# Patient Record
Sex: MALE | Race: WHITE | NOT HISPANIC OR LATINO | Employment: FULL TIME | URBAN - METROPOLITAN AREA
[De-identification: names, ages, dates, MRNs, and addresses within clinical notes are randomized per-mention and may not be internally consistent; named-entity substitution may affect disease eponyms.]

---

## 2017-01-21 ENCOUNTER — HOSPITAL ENCOUNTER (INPATIENT)
Facility: MEDICAL CENTER | Age: 22
LOS: 1 days | DRG: 316 | End: 2017-01-22
Attending: EMERGENCY MEDICINE | Admitting: INTERNAL MEDICINE

## 2017-01-21 ENCOUNTER — RESOLUTE PROFESSIONAL BILLING HOSPITAL PROF FEE (OUTPATIENT)
Dept: HOSPITALIST | Facility: MEDICAL CENTER | Age: 22
End: 2017-01-21

## 2017-01-21 ENCOUNTER — APPOINTMENT (OUTPATIENT)
Dept: RADIOLOGY | Facility: MEDICAL CENTER | Age: 22
DRG: 316 | End: 2017-01-21
Attending: INTERNAL MEDICINE

## 2017-01-21 DIAGNOSIS — R40.0 SOMNOLENCE: ICD-10-CM

## 2017-01-21 DIAGNOSIS — T40.3X1A: ICD-10-CM

## 2017-01-21 PROBLEM — I95.9 HYPOTENSION: Status: ACTIVE | Noted: 2017-01-21

## 2017-01-21 LAB
ALBUMIN SERPL BCP-MCNC: 4.2 G/DL (ref 3.2–4.9)
ALBUMIN/GLOB SERPL: 1.3 G/DL
ALP SERPL-CCNC: 82 U/L (ref 30–99)
ALT SERPL-CCNC: 17 U/L (ref 2–50)
ANION GAP SERPL CALC-SCNC: 10 MMOL/L (ref 0–11.9)
APAP SERPL-MCNC: <10 UG/ML (ref 10–30)
AST SERPL-CCNC: 24 U/L (ref 12–45)
BASOPHILS # BLD AUTO: 0.6 % (ref 0–1.8)
BASOPHILS # BLD: 0.05 K/UL (ref 0–0.12)
BILIRUB SERPL-MCNC: 0.5 MG/DL (ref 0.1–1.5)
BUN SERPL-MCNC: 13 MG/DL (ref 8–22)
CALCIUM SERPL-MCNC: 8.8 MG/DL (ref 8.5–10.5)
CHLORIDE SERPL-SCNC: 101 MMOL/L (ref 96–112)
CK SERPL-CCNC: 77 U/L (ref 0–154)
CO2 SERPL-SCNC: 23 MMOL/L (ref 20–33)
CREAT SERPL-MCNC: 1.09 MG/DL (ref 0.5–1.4)
EOSINOPHIL # BLD AUTO: 0.16 K/UL (ref 0–0.51)
EOSINOPHIL NFR BLD: 1.8 % (ref 0–6.9)
ERYTHROCYTE [DISTWIDTH] IN BLOOD BY AUTOMATED COUNT: 45.9 FL (ref 35.9–50)
ETHANOL BLD-MCNC: 0 G/DL
GFR SERPL CREATININE-BSD FRML MDRD: >60 ML/MIN/1.73 M 2
GLOBULIN SER CALC-MCNC: 3.3 G/DL (ref 1.9–3.5)
GLUCOSE SERPL-MCNC: 348 MG/DL (ref 65–99)
HCT VFR BLD AUTO: 44.3 % (ref 42–52)
HGB BLD-MCNC: 15.2 G/DL (ref 14–18)
IMM GRANULOCYTES # BLD AUTO: 0.03 K/UL (ref 0–0.11)
IMM GRANULOCYTES NFR BLD AUTO: 0.3 % (ref 0–0.9)
LYMPHOCYTES # BLD AUTO: 1.73 K/UL (ref 1–4.8)
LYMPHOCYTES NFR BLD: 19.2 % (ref 22–41)
MAGNESIUM SERPL-MCNC: 1.8 MG/DL (ref 1.5–2.5)
MCH RBC QN AUTO: 30.4 PG (ref 27–33)
MCHC RBC AUTO-ENTMCNC: 34.3 G/DL (ref 33.7–35.3)
MCV RBC AUTO: 88.6 FL (ref 81.4–97.8)
MONOCYTES # BLD AUTO: 0.17 K/UL (ref 0–0.85)
MONOCYTES NFR BLD AUTO: 1.9 % (ref 0–13.4)
NEUTROPHILS # BLD AUTO: 6.89 K/UL (ref 1.82–7.42)
NEUTROPHILS NFR BLD: 76.2 % (ref 44–72)
NRBC # BLD AUTO: 0 K/UL
NRBC BLD AUTO-RTO: 0 /100 WBC
PHOSPHATE SERPL-MCNC: 6.3 MG/DL (ref 2.5–4.5)
PLATELET # BLD AUTO: 195 K/UL (ref 164–446)
PMV BLD AUTO: 8.6 FL (ref 9–12.9)
POTASSIUM SERPL-SCNC: 4.5 MMOL/L (ref 3.6–5.5)
PROT SERPL-MCNC: 7.5 G/DL (ref 6–8.2)
RBC # BLD AUTO: 5 M/UL (ref 4.7–6.1)
SALICYLATES SERPL-MCNC: 0 MG/DL (ref 15–25)
SODIUM SERPL-SCNC: 134 MMOL/L (ref 135–145)
WBC # BLD AUTO: 9 K/UL (ref 4.8–10.8)

## 2017-01-21 PROCEDURE — 93005 ELECTROCARDIOGRAM TRACING: CPT | Performed by: EMERGENCY MEDICINE

## 2017-01-21 PROCEDURE — 700111 HCHG RX REV CODE 636 W/ 250 OVERRIDE (IP): Performed by: EMERGENCY MEDICINE

## 2017-01-21 PROCEDURE — 71010 DX-CHEST-PORTABLE (1 VIEW): CPT

## 2017-01-21 PROCEDURE — 99291 CRITICAL CARE FIRST HOUR: CPT

## 2017-01-21 PROCEDURE — 94760 N-INVAS EAR/PLS OXIMETRY 1: CPT

## 2017-01-21 PROCEDURE — 82550 ASSAY OF CK (CPK): CPT

## 2017-01-21 PROCEDURE — 700102 HCHG RX REV CODE 250 W/ 637 OVERRIDE(OP): Performed by: INTERNAL MEDICINE

## 2017-01-21 PROCEDURE — 700105 HCHG RX REV CODE 258: Performed by: EMERGENCY MEDICINE

## 2017-01-21 PROCEDURE — 80053 COMPREHEN METABOLIC PANEL: CPT

## 2017-01-21 PROCEDURE — 96366 THER/PROPH/DIAG IV INF ADDON: CPT

## 2017-01-21 PROCEDURE — 80307 DRUG TEST PRSMV CHEM ANLYZR: CPT

## 2017-01-21 PROCEDURE — 96376 TX/PRO/DX INJ SAME DRUG ADON: CPT

## 2017-01-21 PROCEDURE — 36415 COLL VENOUS BLD VENIPUNCTURE: CPT

## 2017-01-21 PROCEDURE — 770022 HCHG ROOM/CARE - ICU (200)

## 2017-01-21 PROCEDURE — 96361 HYDRATE IV INFUSION ADD-ON: CPT

## 2017-01-21 PROCEDURE — 99291 CRITICAL CARE FIRST HOUR: CPT | Mod: 25 | Performed by: INTERNAL MEDICINE

## 2017-01-21 PROCEDURE — 700105 HCHG RX REV CODE 258: Performed by: INTERNAL MEDICINE

## 2017-01-21 PROCEDURE — 96375 TX/PRO/DX INJ NEW DRUG ADDON: CPT

## 2017-01-21 PROCEDURE — 99407 BEHAV CHNG SMOKING > 10 MIN: CPT | Performed by: INTERNAL MEDICINE

## 2017-01-21 PROCEDURE — A9270 NON-COVERED ITEM OR SERVICE: HCPCS | Performed by: INTERNAL MEDICINE

## 2017-01-21 PROCEDURE — 96365 THER/PROPH/DIAG IV INF INIT: CPT

## 2017-01-21 PROCEDURE — 700111 HCHG RX REV CODE 636 W/ 250 OVERRIDE (IP): Performed by: INTERNAL MEDICINE

## 2017-01-21 PROCEDURE — 83735 ASSAY OF MAGNESIUM: CPT

## 2017-01-21 PROCEDURE — 85025 COMPLETE CBC W/AUTO DIFF WBC: CPT

## 2017-01-21 PROCEDURE — 84100 ASSAY OF PHOSPHORUS: CPT

## 2017-01-21 RX ORDER — NICOTINE 21 MG/24HR
21 PATCH, TRANSDERMAL 24 HOURS TRANSDERMAL
Status: DISCONTINUED | OUTPATIENT
Start: 2017-01-22 | End: 2017-01-22 | Stop reason: HOSPADM

## 2017-01-21 RX ORDER — SODIUM CHLORIDE 9 MG/ML
INJECTION, SOLUTION INTRAVENOUS CONTINUOUS
Status: DISCONTINUED | OUTPATIENT
Start: 2017-01-21 | End: 2017-01-22

## 2017-01-21 RX ORDER — NALOXONE HYDROCHLORIDE 0.4 MG/ML
0.2 INJECTION, SOLUTION INTRAMUSCULAR; INTRAVENOUS; SUBCUTANEOUS ONCE
Status: COMPLETED | OUTPATIENT
Start: 2017-01-21 | End: 2017-01-21

## 2017-01-21 RX ORDER — NICOTINE 21 MG/24HR
21 PATCH, TRANSDERMAL 24 HOURS TRANSDERMAL
Status: DISCONTINUED | OUTPATIENT
Start: 2017-01-21 | End: 2017-01-21

## 2017-01-21 RX ORDER — ONDANSETRON 4 MG/1
4 TABLET, ORALLY DISINTEGRATING ORAL EVERY 4 HOURS PRN
Status: DISCONTINUED | OUTPATIENT
Start: 2017-01-21 | End: 2017-01-22 | Stop reason: HOSPADM

## 2017-01-21 RX ORDER — ACETAMINOPHEN 325 MG/1
650 TABLET ORAL EVERY 6 HOURS PRN
Status: DISCONTINUED | OUTPATIENT
Start: 2017-01-21 | End: 2017-01-22 | Stop reason: HOSPADM

## 2017-01-21 RX ORDER — PROMETHAZINE HYDROCHLORIDE 25 MG/1
12.5-25 SUPPOSITORY RECTAL EVERY 4 HOURS PRN
Status: DISCONTINUED | OUTPATIENT
Start: 2017-01-21 | End: 2017-01-22 | Stop reason: HOSPADM

## 2017-01-21 RX ORDER — PROMETHAZINE HYDROCHLORIDE 25 MG/1
12.5-25 TABLET ORAL EVERY 4 HOURS PRN
Status: DISCONTINUED | OUTPATIENT
Start: 2017-01-21 | End: 2017-01-22 | Stop reason: HOSPADM

## 2017-01-21 RX ORDER — NICOTINE 21 MG/24HR
21 PATCH, TRANSDERMAL 24 HOURS TRANSDERMAL ONCE
Status: COMPLETED | OUTPATIENT
Start: 2017-01-21 | End: 2017-01-23

## 2017-01-21 RX ORDER — ONDANSETRON 2 MG/ML
4 INJECTION INTRAMUSCULAR; INTRAVENOUS ONCE
Status: COMPLETED | OUTPATIENT
Start: 2017-01-21 | End: 2017-01-21

## 2017-01-21 RX ORDER — ONDANSETRON 2 MG/ML
4 INJECTION INTRAMUSCULAR; INTRAVENOUS EVERY 4 HOURS PRN
Status: DISCONTINUED | OUTPATIENT
Start: 2017-01-21 | End: 2017-01-22 | Stop reason: HOSPADM

## 2017-01-21 RX ORDER — SODIUM CHLORIDE 9 MG/ML
1000 INJECTION, SOLUTION INTRAVENOUS ONCE
Status: COMPLETED | OUTPATIENT
Start: 2017-01-21 | End: 2017-01-21

## 2017-01-21 RX ADMIN — ONDANSETRON 4 MG: 2 INJECTION, SOLUTION INTRAMUSCULAR; INTRAVENOUS at 15:32

## 2017-01-21 RX ADMIN — ONDANSETRON 4 MG: 2 INJECTION, SOLUTION INTRAMUSCULAR; INTRAVENOUS at 20:41

## 2017-01-21 RX ADMIN — NALOXONE HYDROCHLORIDE 0.2 MG: 0.4 INJECTION, SOLUTION INTRAMUSCULAR; INTRAVENOUS; SUBCUTANEOUS at 15:05

## 2017-01-21 RX ADMIN — SODIUM CHLORIDE: 9 INJECTION, SOLUTION INTRAVENOUS at 20:46

## 2017-01-21 RX ADMIN — NICOTINE 21 MG: 21 PATCH TRANSDERMAL at 20:43

## 2017-01-21 RX ADMIN — SODIUM CHLORIDE 1000 ML: 9 INJECTION, SOLUTION INTRAVENOUS at 15:32

## 2017-01-21 RX ADMIN — NALOXONE HYDROCHLORIDE 0.1 MG/HR: 1 INJECTION PARENTERAL at 20:44

## 2017-01-21 ASSESSMENT — PAIN SCALES - GENERAL: PAINLEVEL_OUTOF10: 0

## 2017-01-21 ASSESSMENT — LIFESTYLE VARIABLES: DO YOU DRINK ALCOHOL: NO

## 2017-01-21 NOTE — ED NOTES
"Pt bib ems per report pt ingested 95 mg of his sisters methadone. PT was found apneic and unresponsive.  PT was given narcan PTA.  PT arrives A&O x 4.  PT denies SI or HI states he took the pills to \"get high\".    Chief Complaint   Patient presents with   • Drug Overdose     Blood pressure 131/68, pulse 84, temperature 37.1 °C (98.8 °F), resp. rate 16, height 1.753 m (5' 9\"), weight 58.968 kg (130 lb).    "

## 2017-01-21 NOTE — ED NOTES
The Medication Reconciliation has been completed per patient  Allergies have been reviewed    No Abx in the past month    [facility]

## 2017-01-21 NOTE — ED PROVIDER NOTES
"ED Provider Note    CHIEF COMPLAINT  Chief Complaint   Patient presents with   • Drug Overdose       HPI  Ghulam Schmitt is a 21 y.o. male who presents via ambulance for an apparent overdose on methadone, found apneic and unresponsive by family supposedly so they called EMS. He received Narcan by EMS and awoke appropriately. The patient admits to ingesting 95 mg of a liquid methadone which is prescribed to his sister. He was trying to get high denies suicidal and homicidal thoughts. The patient reports chronic abuse of opiate pills but has never use any injectable drugs. The patient wishes to get clean but seems to be struggling to do so. He has no physical complaints at this time    REVIEW OF SYSTEMS  Negative for fever, rash, chest pain, dyspnea, abdominal pain, nausea, vomiting, diarrhea, headache, focal weakness, focal numbness, focal tingling, back pain. All other systems are negative.     PAST MEDICAL HISTORY  Past Medical History   Diagnosis Date   • Ulcer ([facility])        FAMILY HISTORY  History reviewed. No pertinent family history.    SOCIAL HISTORY  Social History   Substance Use Topics   • Smoking status: Current Every Day Smoker -- 1.00 packs/day     Types: Cigarettes   • Smokeless tobacco: None   • Alcohol Use: Yes      Comment: occasional beers and shots       SURGICAL HISTORY  History reviewed. No pertinent past surgical history.    CURRENT MEDICATIONS  I personally reviewed the medication list in the charting documentation.     ALLERGIES  No Known Allergies    MEDICAL RECORD  I have reviewed patient's medical record and pertinent results are listed above.      PHYSICAL EXAM  VITAL SIGNS: /68 mmHg  Pulse 84  Temp(Src) 37.1 °C (98.8 °F)  Resp 16  Ht 1.753 m (5' 9\")  Wt 58.968 kg (130 lb)  BMI 19.19 kg/m2   Constitutional: Sleepy but arousable to verbal stimulus  HENT: Mucus membranes moist.    Eyes: No scleral icterus. Normal conjunctiva   Neck: Supple, comfortable, nonpainful range of " motion.   Cardiovascular: Regular heart rate and rhythm.   Thorax & Lungs: Chest is nontender.  Lungs are clear to auscultation with good air movement bilaterally.  No wheeze, rhonchi, nor rales.   Abdomen: Soft, with no tenderness, rebound nor guarding.  No mass or pulsatile mass appreciated.  Skin: Warm, dry. No rash appreciated  Extremities/Musculoskeletal: No sign of trauma. No asymmetric calf tenderness, erythema or edema. Normal range of motion   Neurologic: Sleepy but once easily arouse he is alert and oriented with no focal neurologic deficits  Psychiatric: Normal affect appropriate for the clinical situation.    DIAGNOSTIC STUDIES / PROCEDURES    EKG  12 Lead EKG interpreted by me to show:    Rate 71  Rhythm: Normal sinus rhythm  Axis: Normal  DE and QRS Intervals: Normal  T waves: No acute changes  ST segments: No acute changes  Ectopy: None.    My impression of this EKG: Does not indicate ischemia or arrythmia at this time.      LABS  Results for orders placed or performed during the hospital encounter of 01/21/17   Blood Alcohol   Result Value Ref Range    Diagnostic Alcohol 0.00 0.00 g/dL   CBC WITH DIFFERENTIAL   Result Value Ref Range    WBC 9.0 4.8 - 10.8 K/uL    RBC 5.00 4.70 - 6.10 M/uL    Hemoglobin 15.2 14.0 - 18.0 g/dL    Hematocrit 44.3 42.0 - 52.0 %    MCV 88.6 81.4 - 97.8 fL    MCH 30.4 27.0 - 33.0 pg    MCHC 34.3 33.7 - 35.3 g/dL    RDW 45.9 35.9 - 50.0 fL    Platelet Count 195 164 - 446 K/uL    MPV 8.6 (L) 9.0 - 12.9 fL    Neutrophils-Polys 76.20 (H) 44.00 - 72.00 %    Lymphocytes 19.20 (L) 22.00 - 41.00 %    Monocytes 1.90 0.00 - 13.40 %    Eosinophils 1.80 0.00 - 6.90 %    Basophils 0.60 0.00 - 1.80 %    Immature Granulocytes 0.30 0.00 - 0.90 %    Nucleated RBC 0.00 /100 WBC    Neutrophils (Absolute) 6.89 1.82 - 7.42 K/uL    Lymphs (Absolute) 1.73 1.00 - 4.80 K/uL    Monos (Absolute) 0.17 0.00 - 0.85 K/uL    Eos (Absolute) 0.16 0.00 - 0.51 K/uL    Baso (Absolute) 0.05 0.00 - 0.12 K/uL     Immature Granulocytes (abs) 0.03 0.00 - 0.11 K/uL    NRBC (Absolute) 0.00 K/uL   COMP METABOLIC PANEL   Result Value Ref Range    Sodium 134 (L) 135 - 145 mmol/L    Potassium 4.5 3.6 - 5.5 mmol/L    Chloride 101 96 - 112 mmol/L    Co2 23 20 - 33 mmol/L    Anion Gap 10.0 0.0 - 11.9    Glucose 348 (H) 65 - 99 mg/dL    Bun 13 8 - 22 mg/dL    Creatinine 1.09 0.50 - 1.40 mg/dL    Calcium 8.8 8.5 - 10.5 mg/dL    AST(SGOT) 24 12 - 45 U/L    ALT(SGPT) 17 2 - 50 U/L    Alkaline Phosphatase 82 30 - 99 U/L    Total Bilirubin 0.5 0.1 - 1.5 mg/dL    Albumin 4.2 3.2 - 4.9 g/dL    Total Protein 7.5 6.0 - 8.2 g/dL    Globulin 3.3 1.9 - 3.5 g/dL    A-G Ratio 1.3 g/dL   Acetaminophen Level   Result Value Ref Range    Acetomenophen -Tylenol <10 10 - 30 ug/mL   SALICYLATE LEVEL   Result Value Ref Range    Salicylates, Quant. 0 (L) 15 - 25 mg/dL   ESTIMATED GFR   Result Value Ref Range    GFR If African American >60 >60 mL/min/1.73 m 2    GFR If Non African American >60 >60 mL/min/1.73 m 2   CREATINE KINASE   Result Value Ref Range    CPK Total 77 0 - 154 U/L   MAGNESIUM   Result Value Ref Range    Magnesium 1.8 1.5 - 2.5 mg/dL   PHOSPHORUS   Result Value Ref Range    Phosphorus 6.3 (H) 2.5 - 4.5 mg/dL   EKG (NOW)   Result Value Ref Range    Report       [facility] Emergency Dept.    Test Date:  2017  Pt Name:    RUBEN SHETH                Department: ER  MRN:        [id number]                      Room:       RD 03  Gender:     M                            Technician: [id number]  :        1995                   Requested By:CONRAD AGRAWAL  Order #:    [id number]                    Reading MD:    Measurements  Intervals                                Axis  Rate:       71                           P:          70  UT:         140                          QRS:        68  QRSD:       110                          T:          54  QT:         400  QTc:        435    Interpretive Statements  SINUS  RHYTHM  INCOMPLETE RIGHT BUNDLE BRANCH BLOCK  PROBABLE LEFT VENTRICULAR HYPERTROPHY  No previous ECG available for comparison           COURSE & MEDICAL DECISION MAKING  I have reviewed any medical record information, laboratory studies and radiographic results as noted above.    Encounter Summary: This is a 21 y.o. male with an apparent overdose on Narcan, apneic and unresponsive upon arrival of EMS but response to Narcan. Upon my examination he seems to be getting sleepy again which is certainly an effect of the shorter half-life of Narcan when compared to methadone. Once aroused however he is appropriate. Denies any other ingestions and suicidal thought. However given the ingestion of methadone the patient will have to be admitted to the hospital for close observation and repeat dosing of Narcan if needed. I'll check acetaminophen and salicylate with other typical overdose labs and keep a close eye on him here in the emergency department, I will frequently dosed him with Narcan if needed if his mental status deteriorates ---- patient observed closely, his family has been updated and patient has been admitted in guarded condition    CRITICAL CARE  The very real possibilty of a deterioration of this patient's condition required the highest level of my preparedness for sudden, emergent intervention.  I provided critical care services, which included medication orders, frequent reevaluations of the patient's condition and response to treatment, ordering and reviewing test results, and discussing the case with various consultants.  The critical care time associated with the care of the patient was 41 minutes. Review chart for interventions. This time is exclusive of any other billable procedures.         DISPOSITION: Admitted in guarded condition      FINAL IMPRESSION  1. Methadone overdose, accidental or unintentional, initial encounter    2. Somnolence           This dictation was created using voice recognition  software. The accuracy of the dictation is limited to the abilities of the software. I expect there may be some errors of grammar and possibly content. The nursing notes were reviewed and certain aspects of this information were incorporated into this note.    Electronically signed by: Danilo Quintana, 1/21/2017 3:14 PM

## 2017-01-22 VITALS
SYSTOLIC BLOOD PRESSURE: 131 MMHG | HEIGHT: 69 IN | TEMPERATURE: 98.7 F | WEIGHT: 120.59 LBS | RESPIRATION RATE: 27 BRPM | OXYGEN SATURATION: 95 % | DIASTOLIC BLOOD PRESSURE: 68 MMHG | BODY MASS INDEX: 17.86 KG/M2 | HEART RATE: 81 BPM

## 2017-01-22 PROBLEM — Z72.0 TOBACCO ABUSE: Status: ACTIVE | Noted: 2017-01-22

## 2017-01-22 PROBLEM — E83.39 HYPERPHOSPHATEMIA: Status: ACTIVE | Noted: 2017-01-22

## 2017-01-22 LAB
25(OH)D3 SERPL-MCNC: 9 NG/ML (ref 30–100)
AMPHET UR QL SCN: NEGATIVE
ANION GAP SERPL CALC-SCNC: 5 MMOL/L (ref 0–11.9)
BARBITURATES UR QL SCN: NEGATIVE
BASOPHILS # BLD AUTO: 0.3 % (ref 0–1.8)
BASOPHILS # BLD: 0.03 K/UL (ref 0–0.12)
BENZODIAZ UR QL SCN: NEGATIVE
BUN SERPL-MCNC: 10 MG/DL (ref 8–22)
BZE UR QL SCN: NEGATIVE
CALCIUM SERPL-MCNC: 8.4 MG/DL (ref 8.5–10.5)
CANNABINOIDS UR QL SCN: POSITIVE
CHLORIDE SERPL-SCNC: 105 MMOL/L (ref 96–112)
CO2 SERPL-SCNC: 25 MMOL/L (ref 20–33)
CREAT SERPL-MCNC: 0.78 MG/DL (ref 0.5–1.4)
EOSINOPHIL # BLD AUTO: 0.31 K/UL (ref 0–0.51)
EOSINOPHIL NFR BLD: 3.3 % (ref 0–6.9)
ERYTHROCYTE [DISTWIDTH] IN BLOOD BY AUTOMATED COUNT: 44.5 FL (ref 35.9–50)
GFR SERPL CREATININE-BSD FRML MDRD: >60 ML/MIN/1.73 M 2
GLUCOSE SERPL-MCNC: 116 MG/DL (ref 65–99)
HCT VFR BLD AUTO: 37.9 % (ref 42–52)
HGB BLD-MCNC: 12.6 G/DL (ref 14–18)
IMM GRANULOCYTES # BLD AUTO: 0.01 K/UL (ref 0–0.11)
IMM GRANULOCYTES NFR BLD AUTO: 0.1 % (ref 0–0.9)
LYMPHOCYTES # BLD AUTO: 3.21 K/UL (ref 1–4.8)
LYMPHOCYTES NFR BLD: 34.3 % (ref 22–41)
MCH RBC QN AUTO: 28.8 PG (ref 27–33)
MCHC RBC AUTO-ENTMCNC: 33.2 G/DL (ref 33.7–35.3)
MCV RBC AUTO: 86.7 FL (ref 81.4–97.8)
MDMA UR QL SCN: NEGATIVE
METHADONE UR QL SCN: POSITIVE
MONOCYTES # BLD AUTO: 0.65 K/UL (ref 0–0.85)
MONOCYTES NFR BLD AUTO: 7 % (ref 0–13.4)
NEUTROPHILS # BLD AUTO: 5.14 K/UL (ref 1.82–7.42)
NEUTROPHILS NFR BLD: 55 % (ref 44–72)
NRBC # BLD AUTO: 0 K/UL
NRBC BLD AUTO-RTO: 0 /100 WBC
OPIATES UR QL SCN: NEGATIVE
OXYCODONE UR QL SCN: POSITIVE
PCP UR QL SCN: NEGATIVE
PHOSPHATE SERPL-MCNC: 3 MG/DL (ref 2.5–4.5)
PLATELET # BLD AUTO: 163 K/UL (ref 164–446)
PMV BLD AUTO: 8.6 FL (ref 9–12.9)
POTASSIUM SERPL-SCNC: 3.8 MMOL/L (ref 3.6–5.5)
PROPOXYPH UR QL SCN: NEGATIVE
PTH-INTACT SERPL-MCNC: 36 PG/ML (ref 14–72)
RBC # BLD AUTO: 4.37 M/UL (ref 4.7–6.1)
SODIUM SERPL-SCNC: 135 MMOL/L (ref 135–145)
WBC # BLD AUTO: 9.4 K/UL (ref 4.8–10.8)

## 2017-01-22 PROCEDURE — 84100 ASSAY OF PHOSPHORUS: CPT

## 2017-01-22 PROCEDURE — A9270 NON-COVERED ITEM OR SERVICE: HCPCS | Performed by: INTERNAL MEDICINE

## 2017-01-22 PROCEDURE — 90471 IMMUNIZATION ADMIN: CPT

## 2017-01-22 PROCEDURE — 99239 HOSP IP/OBS DSCHRG MGMT >30: CPT | Performed by: HOSPITALIST

## 2017-01-22 PROCEDURE — 90732 PPSV23 VACC 2 YRS+ SUBQ/IM: CPT | Performed by: INTERNAL MEDICINE

## 2017-01-22 PROCEDURE — 80048 BASIC METABOLIC PNL TOTAL CA: CPT

## 2017-01-22 PROCEDURE — 82306 VITAMIN D 25 HYDROXY: CPT

## 2017-01-22 PROCEDURE — 83970 ASSAY OF PARATHORMONE: CPT

## 2017-01-22 PROCEDURE — 85025 COMPLETE CBC W/AUTO DIFF WBC: CPT

## 2017-01-22 PROCEDURE — 3E0234Z INTRODUCTION OF SERUM, TOXOID AND VACCINE INTO MUSCLE, PERCUTANEOUS APPROACH: ICD-10-PCS | Performed by: INTERNAL MEDICINE

## 2017-01-22 PROCEDURE — 90686 IIV4 VACC NO PRSV 0.5 ML IM: CPT | Performed by: INTERNAL MEDICINE

## 2017-01-22 PROCEDURE — 700105 HCHG RX REV CODE 258: Performed by: INTERNAL MEDICINE

## 2017-01-22 PROCEDURE — 700102 HCHG RX REV CODE 250 W/ 637 OVERRIDE(OP): Performed by: INTERNAL MEDICINE

## 2017-01-22 PROCEDURE — 700111 HCHG RX REV CODE 636 W/ 250 OVERRIDE (IP): Performed by: INTERNAL MEDICINE

## 2017-01-22 RX ADMIN — NICOTINE 21 MG: 21 PATCH TRANSDERMAL at 11:42

## 2017-01-22 RX ADMIN — PROCHLORPERAZINE EDISYLATE 10 MG: 5 INJECTION INTRAMUSCULAR; INTRAVENOUS at 11:42

## 2017-01-22 RX ADMIN — SODIUM CHLORIDE: 9 INJECTION, SOLUTION INTRAVENOUS at 01:19

## 2017-01-22 RX ADMIN — PNEUMOCOCCAL VACCINE POLYVALENT 25 MCG
25; 25; 25; 25; 25; 25; 25; 25; 25; 25; 25; 25; 25; 25; 25; 25; 25; 25; 25; 25; 25; 25; 25 INJECTION, SOLUTION INTRAMUSCULAR; SUBCUTANEOUS at 05:30

## 2017-01-22 RX ADMIN — SODIUM CHLORIDE: 9 INJECTION, SOLUTION INTRAVENOUS at 07:54

## 2017-01-22 RX ADMIN — INFLUENZA A VIRUS A/CALIFORNIA/7/2009 X-179A (H1N1) ANTIGEN (FORMALDEHYDE INACTIVATED), INFLUENZA A VIRUS A/HONG KONG/4801/2014 X-263B (H3N2) ANTIGEN (FORMALDEHYDE INACTIVATED), INFLUENZA B VIRUS B/PHUKET/3073/2013 ANTIGEN (FORMALDEHYDE INACTIVATED), AND INFLUENZA B VIRUS B/BRISBANE/60/2008 ANTIGEN (FORMALDEHYDE INACTIVATED) 0.5 ML: 15; 15; 15; 15 INJECTION, SUSPENSION INTRAMUSCULAR at 05:31

## 2017-01-22 ASSESSMENT — ENCOUNTER SYMPTOMS
NAUSEA: 1
CHILLS: 0
INSOMNIA: 1
NERVOUS/ANXIOUS: 1
VOMITING: 0
FEVER: 0
ABDOMINAL PAIN: 1
SHORTNESS OF BREATH: 0
MEMORY LOSS: 1
COUGH: 0

## 2017-01-22 ASSESSMENT — LIFESTYLE VARIABLES
ON A TYPICAL DAY WHEN YOU DRINK ALCOHOL HOW MANY DRINKS DO YOU HAVE: 2
HAVE YOU EVER FELT YOU SHOULD CUT DOWN ON YOUR DRINKING: YES
TOTAL SCORE: 1
ALCOHOL_USE: YES
AVERAGE NUMBER OF DAYS PER WEEK YOU HAVE A DRINK CONTAINING ALCOHOL: 1
HOW MANY TIMES IN THE PAST YEAR HAVE YOU HAD 5 OR MORE DRINKS IN A DAY: 3
EVER FELT BAD OR GUILTY ABOUT YOUR DRINKING: NO
CONSUMPTION TOTAL: POSITIVE
TOTAL SCORE: 1
HAVE PEOPLE ANNOYED YOU BY CRITICIZING YOUR DRINKING: NO
SUBSTANCE_ABUSE: 1
EVER_SMOKED: YES
TOTAL SCORE: 1
EVER HAD A DRINK FIRST THING IN THE MORNING TO STEADY YOUR NERVES TO GET RID OF A HANGOVER: NO

## 2017-01-22 ASSESSMENT — COPD QUESTIONNAIRES
HAVE YOU SMOKED AT LEAST 100 CIGARETTES IN YOUR ENTIRE LIFE: YES
DO YOU EVER COUGH UP ANY MUCUS OR PHLEGM?: NO/ONLY WITH OCCASIONAL COLDS OR INFECTIONS
DURING THE PAST 4 WEEKS HOW MUCH DID YOU FEEL SHORT OF BREATH: NONE/LITTLE OF THE TIME

## 2017-01-22 ASSESSMENT — PAIN SCALES - GENERAL
PAINLEVEL_OUTOF10: 0

## 2017-01-22 NOTE — PROGRESS NOTES
Pt transported from [facility] to [facility] on monitor with ACLS RN and CNA at approximately 0050. Pt tolerated the transfer without difficulty.  Pt A&Ox4, BIRD, and denies pain; appears to be fatigued, but no distress.  [facility] bath and physical assessment completed.  Pt turns self in bed.  Fall precautions in place.  SCDs in place.  Call light and personal belongings within reach.  Hourly rounding in effect.

## 2017-01-22 NOTE — ED NOTES
Pt stood at bedside and provided urine sample. Pt back to bed. No other needs at this time. Aware of POC.

## 2017-01-22 NOTE — CARE PLAN
Problem: Safety  Goal: Will remain free from falls  Intervention: Assess risk factors for falls  Patient asked to call for assistance before attempting to get out of bed      Problem: Venous Thromboembolism (VTW)/Deep Vein Thrombosis (DVT) Prevention:  Goal: Patient will participate in Venous Thrombosis (VTE)/Deep Vein Thrombosis (DVT)Prevention Measures  Intervention: Encourage ambulation/mobilization at level directed by Physical Therapy in collaboration with Interdisciplinary Team  Ambulate in aceves as tolerated.

## 2017-01-22 NOTE — PROGRESS NOTES
"Hospital Medicine Progress Note, Adult, Complex               Author: Ernie Figueroa Date & Time created: 1/22/2017  7:06 AM     Interval History:  Pt seen and evaluated in the ICU. Mr. Schmitt has a hx of tobacco dependence and recreational narcotic use that took approximately 95 mg of liquid methadone. He was found unresponsive and was brought to the ER and required narcan drip 0.1 which remained on until 13:00. We had a long discussion about his narcotic addiction. Ghulam uses oxycodone or hydrocodone 4-5x/week. He thought that his sister's dose of methadone would work for him though she clearly has a significant tolerance. He states that he wanted to \"get high\" but is adamant that he was not suicidal. He starts a new job at [employer] in [place] tomorrow earning $18.50 an hour and he feels this will be a turning point in his life.     Review of Systems:  Review of Systems   Constitutional: Negative for fever and chills.   HENT: Negative.    Respiratory: Negative for cough and shortness of breath.    Gastrointestinal: Positive for nausea and abdominal pain. Negative for vomiting.   Skin: Negative for itching and rash.   Psychiatric/Behavioral: Positive for memory loss and substance abuse. The patient is nervous/anxious and has insomnia.        Physical Exam:  Physical Exam   Constitutional: He is oriented to person, place, and time.   thin   Neck: Neck supple.   Cardiovascular: Normal rate and regular rhythm.    No murmur heard.  Pulmonary/Chest: No respiratory distress. He has no wheezes.   Abdominal: Soft. He exhibits no distension.   Neurological: He is alert and oriented to person, place, and time.   Skin: There is pallor.   Psychiatric: He has a normal mood and affect. His behavior is normal.       Labs:        Invalid input(s): [id number]  Recent Labs      01/21/17   1438   CPKTOTAL  77     Recent Labs      01/21/17   1438  01/22/17   0229   SODIUM  134*  135   POTASSIUM  4.5  3.8   CHLORIDE  101  105   CO2  23 "  25   BUN  13  10   CREATININE  1.09  0.78   MAGNESIUM  1.8   --    PHOSPHORUS  6.3*  3.0   CALCIUM  8.8  8.4*     Recent Labs      17   ALTSGPT  17   --    ASTSGOT  24   --    ALKPHOSPHAT  82   --    TBILIRUBIN  0.5   --    GLUCOSE  348*  116*     Recent Labs      17   RBC  5.00  4.37*   HEMOGLOBIN  15.2  12.6*   HEMATOCRIT  44.3  37.9*   PLATELETCT  195  163*     Recent Labs      17   WBC  9.0  9.4   NEUTSPOLYS  76.20*  55.00   LYMPHOCYTES  19.20*  34.30   MONOCYTES  1.90  7.00   EOSINOPHILS  1.80  3.30   BASOPHILS  0.60  0.30   ASTSGOT  24   --    ALTSGPT  17   --    ALKPHOSPHAT  82   --    TBILIRUBIN  0.5   --            Hemodynamics:  Temp (24hrs), Av.2 °C (99 °F), Min:37.1 °C (98.8 °F), Max:37.5 °C (99.5 °F)  Temperature: 37.5 °C (99.5 °F)  Pulse  Av.8  Min: 52  Max: 84Heart Rate (Monitored): (!) 57  Blood Pressure: 131/68 mmHg, NIBP: 104/81 mmHg     Respiratory:    Respiration: 19, Pulse Oximetry: 100 %        RUL Breath Sounds: Clear, RML Breath Sounds: Clear, RLL Breath Sounds: Clear;Diminished, AIDEN Breath Sounds: Clear, LLL Breath Sounds: Clear;Diminished  Fluids:    Intake/Output Summary (Last 24 hours) at 17 0706  Last data filed at 17 0600   Gross per 24 hour   Intake    400 ml   Output    375 ml   Net     25 ml     Weight: 54.7 kg (120 lb 9.5 oz)  GI/Nutrition:  Orders Placed This Encounter   Procedures   • Diet Order     Standing Status: Standing      Number of Occurrences: 1      Standing Expiration Date:      Order Specific Question:  Diet:     Answer:  Regular [1]     Medical Decision Making, by Problem:  Active Hospital Problems    Diagnosis   • Methadone overdose [T40.3X1A]with associated hypotension and requiring narcan drip. Turn of the narcan drip and, if he can remain off it, he may be discharged at 06:00 with his mother in order to start his new job.  will consult and  give info on outpt resources.    • Hypotension [I95.9]IV fluids given   • Tobacco abuse [Z72.0]patch   • Hyperphosphatemia [E83.39]       Labs reviewed and Medications reviewed        DVT Prophylaxis: Enoxaparin (Lovenox)

## 2017-01-22 NOTE — H&P
CHIEF COMPLAIN:  Drug overdose.    HISTORY OF PRESENT ILLNESS:  This is a 21-year-old male who is found   unresponsive at home by his mother at 2 p.m. with no spontaneous breathing   movement.  EMS was called and the patient was giving Narcan and the patient   regained consciousness.  The patient states that he ingested 95 mg of liquid   methadone, which was prescribed to his sister.  The patient states that he   recreationally uses opiates daily and takes up to 20 to 40 mg of Vicodin and   Percocet daily.  The patient states that he had no suicidal ideation and was   just trying to get high.  The patient denies chest pain, shortness of breath,   fever, headache, or abdominal pain.    REVIEW OF SYSTEMS:  Please see HPI.  All other systems were reviewed and are   negative.    PAST MEDICAL HISTORY:  1.  Ulcers.  2.  Heartburn.    PAST SURGICAL HISTORY:  No pertinent past surgical history.    OUTPATIENT MEDICATIONS:  The patient is not on any prescription medications.    MEDICATION ALLERGIES:  No known allergies.    FAMILY HISTORY:  No pertinent family history.    SOCIAL HISTORY:  The patient is an everyday smoker.  He smokes one pack per   day for the past 8 years.  The patient drinks 2 to 3 drinks per week of   alcohol.  The patient uses opiates recreationally.  He denies use of meth or   cocaine.    PHYSICAL EXAMINATION:  VITAL SIGNS:  Blood pressure 131/68, pulse 62, temperature 37.1, respiratory   rate 12, height 1.75 meters, weight 58.9 kg, and oxygen saturation 98%.  CONSTITUTIONAL:  Well-developed and well-nourished appears somnolent, but   arouseable.  HEENT:  Normocephalic and atraumatic.  Bilateral ears normal.  Nose is normal.    Oral mucous membrane moist.  Eyes, PERRLA.  Extraocular muscle is intact.    Conjunctiva is normal.  No discharge.  NECK:  Supple without lymphadenopathy.  CARDIOVASCULAR:  Normal heart rate, normal rhythm.  No murmurs.  No cyanosis,   clubbing, or edema.  LUNG:  Respiratory effort  is normal.  Normal breath sounds.  Clear to   auscultation bilaterally.  No rales or wheezing.  ABDOMEN:  Abdomen is soft and nontender.  No guarding or rebound.  EXTREMITIES:  Pulse is intact.  No cyanosis or edema.  NEUROLOGIC:  Alert and oriented to time, place, and person.  Strength and   sensation is intact.  No focal deficits.  Cranial nerves intact.  PSYCHIATRIC:  No anxiety or depression.    PERTINENT LABORATORY DATA AND IMAGING:  Blood alcohol level is 0.0.  WBC is 9,   hemoglobin 15.2, hematocrit 44.3, MCV 88.6, and platelet count 195.  Sodium   is 134, potassium 4.5, chloride 101, bicarb 23, anion gap 10, glucose 102, BUN   13, creatinine 1.09, calcium 8.8.  AST is 24, ALT 17, alkaline phosphatase   is 82, total bili 0.5, albumin 4.2.  Acetaminophen level is less than 10.    Salicylate level is 0.  Estimated GFR is greater than 60.  CPK is 77.  Magnesium is   1.8.  Phosphorus is 6.3.  EKG reveals sinus rhythm with incomplete right   bundle branch block, rate 71, QRS is 110, .  Chest x-ray, no acute   cardiopulmonary process noted.    ASSESSMENT:  1.  Methadone overdose.  2.  Dehydration.  3.  Hyperphosphatemia.  4.  Tobacco abuse.    PLAN:  The patient will be admitted to the ICU on cardiac monitoring.  The   patient will be started on a Narcan drip and will be titrated to a RASS score   of 0 and respiratory rate greater than 12 per minute.  The patient will   receive RT protocol and was encouraged to use incentive spirometer.  The   patient denies any suicidal ideation.  For his hyperphosphatemia, it is likely   due to an exogenous phosphorus load; however, we will check a parathyroid   hormone level and vitamin D level.  We will provide IV fluid hydration with   normal saline, which will help flush out some of his excess phosphorus.  The   patient was counselled extensively on cessation of use of opiates.  The   patient was also counselled on cessation of tobacco abuse for greater than 10  minutes.  We will provide the patient with nicotine replacement.    PROPHYLAXIS:  The patient will receive sequential compression devices for DVT   prophylaxis.    CODE:  Full code.    I spent a total of 40 minutes of critical care time during this clinical encounter of which > 50% was devoted to counseling and coordinating care including review of records, pertinent lab data and studies, as well as discussing diagnostic evaluation and work up, planned therapeutic interventions and future disposition of care. Where indicated, the assessment and plan reflect discussion of patient with other healthcare providers, family members. This time includes no procedures or overlap with other providers.         ____________________________________     MD NELSON Uribe / DIO    DD:  01/22/2017 01:08:04  DT:  01/22/2017 03:53:51    D#:  [id number]  Job#:  [id number]

## 2017-01-22 NOTE — CARE PLAN
Problem: Safety  Goal: Will remain free from injury  Bed in lowest, locked position with bed alarm set, call light and personal belongings within reach, room near nursing station    Problem: Venous Thromboembolism (VTW)/Deep Vein Thrombosis (DVT) Prevention:  Goal: Patient will participate in Venous Thrombosis (VTE)/Deep Vein Thrombosis (DVT)Prevention Measures  SCDs in place, education provided

## 2017-01-22 NOTE — ED NOTES
PT updated on plan of care, pt denies needs at this time.  Family remains at bedside. PT sleepy but awakens to voice

## 2017-01-22 NOTE — ED NOTES
Pt up to bathroom with steady gait. Back to bed, reporting shakiness and nausea. Pt medicated per MAR. Lights dimmed for comfort. Will continue to monitor.

## 2017-01-22 NOTE — ED NOTES
Family leaving for the night. Mom, Anival [phone], and Sig other, Ahmet [phone]. Pt sleeping on gurney with even respirations, wakes to verbal stimuli. Awaiting admission.

## 2017-01-23 NOTE — PROGRESS NOTES
Patient wishing to leave AMA, despite hospitalist orders to discharge at 0600. RN educated patient on risks of leaving against medical advice. Pt wishes to continue to leave AMA. Dr. Ayala, hospitalist, notified. Documentation provided and signed.

## 2017-01-24 NOTE — DISCHARGE SUMMARY
"DATE OF ADMISSION:  01/21/2017    DATE OF DISCHARGE:  01/22/2017    DISCHARGE DIAGNOSES:  1.  Non-purposeful methadone overdose.  2.  Against medical advice.  3.  Status post transient hypotension.  4.  Hyperphosphatemia.  5.  Tobacco abuse.  6.  Low vitamin D levels.    LABORATORY DATA:  White blood cell count was 9 on admission, phosphorus was   initially low at 6.3 on admission went down to 3, tox screen is positive for   cannabis, methadone, and oxycodone.  Creatinine is 0.78.  He had normal liver   function tests.  Low vitamin D levels.  Chest x-ray showed no acute processes.    HOSPITAL COURSE:  On 01/21/2017, this 21-year-old male with no primary care   provider was brought in after being found unresponsive by his mother.  He had   taken approximately 95 mg of liquid methadone, which is his sisters methadone.    He took this to \"get high.\"  He usually uses 20-40 mg of either Vicodin or   Percocet on about every other day basis when he gives access to it.  He felt   that since it was his sister's dose he will be able to handle it.  He was   placed on Narcan drip.  Narcan drip was turned off on 01/22/2017.  On   01/22/2017, I personally interviewed the patient off of the drip.  He was   doing quite well.  He made it very clear that he had no suicidal ideation.  In   fact he was looking forward to starting a new job at [employer].    He will be getting $18 dollars 50 cents an hour, which is going to be a very   lucrative job for him.  I did not place him on a legal hold as he did not seem   suicidal as he was able to make his own decisions, requested he would stay in   the hospital and off of the Narcan drip until the morning of 01/23/2017 and   he will be discharged hopefully he could start his new job.  He elected to go   against medical advice and signed paperwork.    On 01/22/2017  did meet with him and gave him information on   resources for outpatient assistance to get off of the " narcotics.  The patient   was very interested in this when we discussed it.       ____________________________________     MD DIVINA LOZOYA / DIO    DD:  01/23/2017 17:35:31  DT:  01/23/2017 22:59:13    D#:  [id number]  Job#:  [id number]

## 2017-01-25 LAB — EKG IMPRESSION: NORMAL

## 2017-02-23 NOTE — ADDENDUM NOTE
Encounter addended by: Almaz Reddy R.N. on: 2/23/2017  1:09 PM<BR>     Documentation filed: Utilization Review, Inpatient Document Flowsheet

## 2019-09-27 ENCOUNTER — HOSPITAL ENCOUNTER (EMERGENCY)
Facility: MEDICAL CENTER | Age: 24
End: 2019-09-27
Attending: EMERGENCY MEDICINE

## 2019-09-27 VITALS
HEIGHT: 69 IN | DIASTOLIC BLOOD PRESSURE: 87 MMHG | RESPIRATION RATE: 20 BRPM | WEIGHT: 121.91 LBS | SYSTOLIC BLOOD PRESSURE: 142 MMHG | HEART RATE: 64 BPM | TEMPERATURE: 97.8 F | OXYGEN SATURATION: 97 % | BODY MASS INDEX: 18.06 KG/M2

## 2019-09-27 DIAGNOSIS — B34.9 VIRAL SYNDROME: ICD-10-CM

## 2019-09-27 DIAGNOSIS — K02.9 DENTAL CARIES: ICD-10-CM

## 2019-09-27 PROCEDURE — 99283 EMERGENCY DEPT VISIT LOW MDM: CPT

## 2019-09-27 RX ORDER — IBUPROFEN 600 MG/1
600 TABLET ORAL EVERY 6 HOURS PRN
Qty: 30 TAB | Refills: 0 | Status: SHIPPED | OUTPATIENT
Start: 2019-09-27 | End: 2019-09-27 | Stop reason: SDUPTHER

## 2019-09-27 RX ORDER — IBUPROFEN 600 MG/1
600 TABLET ORAL EVERY 6 HOURS PRN
Qty: 30 TAB | Refills: 0 | Status: SHIPPED | OUTPATIENT
Start: 2019-09-27

## 2019-09-27 RX ORDER — AMOXICILLIN 500 MG/1
500 CAPSULE ORAL 3 TIMES DAILY
Qty: 30 CAP | Refills: 0 | Status: SHIPPED | OUTPATIENT
Start: 2019-09-27 | End: 2021-03-12

## 2019-09-27 RX ORDER — AMOXICILLIN 500 MG/1
500 CAPSULE ORAL 3 TIMES DAILY
Qty: 30 CAP | Refills: 0 | Status: SHIPPED | OUTPATIENT
Start: 2019-09-27 | End: 2019-09-27 | Stop reason: SDUPTHER

## 2019-09-27 ASSESSMENT — LIFESTYLE VARIABLES: DO YOU DRINK ALCOHOL: NO

## 2019-09-27 NOTE — ED PROVIDER NOTES
"ED Provider Note    CHIEF COMPLAINT  Chief Complaint   Patient presents with   • Dental Pain   • Nasal Congestion   • Cough     Seen at 3:20 PM    HPI  Ghulam Schmitt is a 24 y.o. male employee of the [employer] who presents with several days of malaise, dental pain.  He notes a mild nonproductive cough for the past 72 hours, intermittent headaches yesterday, none today.  He had several episodes of loose bowel movements yesterday, none today.  He denies any nausea, vomiting, chest pain or shortness of breath.  He denies any abdominal pain.  He has widespread dental pain but predominantly in the left lower molar region.  He does have a history of chronic dental disease and is waiting for his insurance to kick in so that he can get definitive treatment.  He is mostly concerned about the dentalgia today.        REVIEW OF SYSTEMS  See HPI  PAST MEDICAL HISTORY   has a past medical history of Ulcer.    SOCIAL HISTORY  Social History     Tobacco Use   • Smoking status: Current Every Day Smoker     Packs/day: 1.00     Types: Cigarettes   Substance and Sexual Activity   • Alcohol use: Yes     Comment: occasional beers and shots   • Drug use: Yes     Types: Inhaled     Comment: marijuania inhaled and pain medications that are not prescribed ot him    • Sexual activity: Not on file       SURGICAL HISTORY  patient denies any surgical history    CURRENT MEDICATIONS  Reviewed.  See Encounter Summary.     ALLERGIES  No Known Allergies    PHYSICAL EXAM  VITAL SIGNS: /92   Pulse 73   Temp 36.7 °C (98.1 °F) (Temporal)   Resp 20   Ht 1.753 m (5' 9\")   Wt 55.3 kg (121 lb 14.6 oz)   SpO2 96%   BMI 18.00 kg/m²   Constitutional: Awake, alert in no apparent distress.  HENT: Normocephalic, Bilateral external ears normal. Nose normal.  No trismus, the floor the oropharynx is normal.  The patient has moderate tonsillar hypertrophy, the right side is greater in the left, no exudate.  He also has widespread dental decay, " the front incisor on the right maxillary region is cracked, the molars are eroded down to the gingival lining, there are no areas of periapical swelling or fluctuance.  No cervical lymphadenopathy.  Eyes: Conjunctiva normal, non-icteric, EOMI.    Thorax & Lungs: Easy unlabored respirations quiet breath sounds but no wheezes.  Cardiovascular: Regular rate and rhythm.  Abdomen:  No distention  Skin: Visualized skin is  Dry, No erythema, No rash.   Extremities:   atraumatic   Neurologic: Alert, Grossly non-focal.   Psychiatric: Affect and Mood normal    RADIOLOGY  No orders to display       Nursing notes and vital signs were reviewed. (See chart for details)    Decision Making:  This is a 24 y.o. year old male who presents with widespread dental decay, multiple cracked and chipped teeth.  The patient will likely need full extraction of most of his teeth, see very few teeth that are salvageable.  Ultimately this will have to be handled through an oral surgeon though dentist at the [facility] may be able to extract some of the affected teeth.  There are no signs currently of a deep space abscess.  The remaining symptoms appear to be consistent with a viral syndrome.  The patient is hemodynamically stable and well-appearing.  Supportive care is recommended for the viral issues.  I will place him on amoxicillin for the dentalgia as well as some anti-inflammatories.  The patient's blood pressure is elevated today. >120/80. I have referred them to primary care for follow up.         DISPOSITION:  Patient will be discharged home in good condition.    Discharge Medications:  Current Discharge Medication List      START taking these medications    Details   amoxicillin (AMOXIL) 500 MG Cap Take 1 Cap by mouth 3 times a day.  Qty: 30 Cap, Refills: 0      ibuprofen (MOTRIN) 600 MG Tab Take 1 Tab by mouth every 6 hours as needed.  Qty: 30 Tab, Refills: 0             The patient was discharged home (see d/c instructions) and told  to return immediately for any signs or symptoms listed, or any worsening at all.  The patient verbally agreed to the discharge precautions and follow-up plan which is documented in EPIC.          FINAL IMPRESSION  1. Dental caries    2. Viral syndrome

## 2019-09-27 NOTE — ED NOTES
Discharge instructions given.  All questions answered.  Prescriptions given x2.  Pt to follow-up with [facility], return to ER if symptoms worsen as discussed.  Pt verbalized understanding.  All belongings with pt.  Pt ambulated to lobby.

## 2019-09-27 NOTE — ED TRIAGE NOTES
Pt to triage, c/o lt sided dental pain , believes this is causing his headaches. Also c/o cough/ congestion

## 2021-03-12 ENCOUNTER — OFFICE VISIT (OUTPATIENT)
Dept: URGENT CARE | Facility: PHYSICIAN GROUP | Age: 26
End: 2021-03-12

## 2021-03-12 VITALS
BODY MASS INDEX: 18.51 KG/M2 | OXYGEN SATURATION: 98 % | HEIGHT: 69 IN | TEMPERATURE: 99.7 F | WEIGHT: 125 LBS | SYSTOLIC BLOOD PRESSURE: 122 MMHG | HEART RATE: 89 BPM | RESPIRATION RATE: 18 BRPM | DIASTOLIC BLOOD PRESSURE: 70 MMHG

## 2021-03-12 DIAGNOSIS — Z72.52 HIGH RISK HOMOSEXUAL BEHAVIOR: ICD-10-CM

## 2021-03-12 PROCEDURE — 99204 OFFICE O/P NEW MOD 45 MIN: CPT | Performed by: PHYSICIAN ASSISTANT

## 2021-03-12 RX ORDER — EMTRICITABINE 200 MG/1
200 CAPSULE ORAL DAILY
Qty: 30 CAPSULE | Refills: 0 | Status: SHIPPED | OUTPATIENT
Start: 2021-03-12

## 2021-03-12 RX ORDER — TENOFOVIR DISOPROXIL FUMARATE 300 MG/1
300 TABLET, FILM COATED ORAL DAILY
Qty: 30 TABLET | Refills: 0 | Status: SHIPPED | OUTPATIENT
Start: 2021-03-12

## 2021-03-13 ENCOUNTER — TELEPHONE (OUTPATIENT)
Dept: URGENT CARE | Facility: CLINIC | Age: 26
End: 2021-03-13

## 2021-03-13 ASSESSMENT — ENCOUNTER SYMPTOMS
DIARRHEA: 0
COUGH: 0
ABDOMINAL PAIN: 0
SHORTNESS OF BREATH: 1
SORE THROAT: 1
HEADACHES: 1
CHILLS: 1
FEVER: 1
CONSTIPATION: 0
MYALGIAS: 1
EYE PAIN: 0
VOMITING: 0
NAUSEA: 0

## 2021-03-13 NOTE — PROGRESS NOTES
Subjective:   Ghulam Schmitt is a 25 y.o. male who presents for Body Aches (x1 day. bodyache, sob, sore throat)      HPI:  This is a concern 25-year-old who complains of acute onset body aches, sore throat, headache, subjective fevers, and mild shortness of breath that he woke up with this morning.  He has no known sick contacts but he is very concerned.  The patient reports that he recently had a MSM encounter where he participated in unprotected intercourse.  He does not know the HIV or other viral statuses of this sexual partner and he speculates that his current syndrome could be seroconversion or an acute hepatitis episode.  This sexual encounter was less than 48 hours ago.  The patient has no known STDs or viral hepatitis or HIV.  He is very concerned about finances as he does not currently have health insurance.  He is interested in postexposure prophylaxis.  He denies any dysuria or penile discharge, he denies any genital lesions.    Review of Systems   Constitutional: Positive for chills, fever and malaise/fatigue.   HENT: Positive for sore throat. Negative for congestion and ear pain.    Eyes: Negative for pain.   Respiratory: Positive for shortness of breath. Negative for cough.    Cardiovascular: Negative for chest pain.   Gastrointestinal: Negative for abdominal pain, constipation, diarrhea, nausea and vomiting.   Genitourinary: Negative for dysuria.   Musculoskeletal: Positive for myalgias.   Skin: Negative for rash.   Neurological: Positive for headaches.       Medications:    • none    Allergies: Patient has no known allergies.    Problem List: Ghulam cShmitt has Methadone overdose (HCC); Hypotension; Tobacco abuse; and Hyperphosphatemia on their problem list.    Surgical History:  No past surgical history on file.    Past Social Hx: Ghulam Schmitt  reports that he has been smoking cigarettes. He has been smoking about 1.00 pack per day. He does not have any smokeless tobacco  "history on file. He reports current alcohol use. He reports current drug use. Drug: Inhaled.     Past Family Hx:  Ghulam Schmitt family history is not on file.     Problem list, medications, and allergies reviewed by myself today in Epic.     Objective:     /70   Pulse 89   Temp 37.6 °C (99.7 °F) (Temporal)   Resp 18   Ht 1.753 m (5' 9\")   Wt 56.7 kg (125 lb)   SpO2 98%   BMI 18.46 kg/m²     Physical Exam  Vitals reviewed.   Constitutional:       Appearance: Normal appearance. He is not toxic-appearing.   HENT:      Head: Normocephalic and atraumatic.      Right Ear: External ear normal.      Left Ear: External ear normal.      Nose: Rhinorrhea present.      Mouth/Throat:      Mouth: Mucous membranes are moist.      Pharynx: Posterior oropharyngeal erythema present. No oropharyngeal exudate.   Eyes:      Conjunctiva/sclera: Conjunctivae normal.   Cardiovascular:      Rate and Rhythm: Normal rate and regular rhythm.      Heart sounds: Normal heart sounds.   Pulmonary:      Effort: Pulmonary effort is normal.      Breath sounds: Normal breath sounds.   Abdominal:      Palpations: Abdomen is soft.      Tenderness: There is no abdominal tenderness.   Skin:     General: Skin is warm and dry.      Capillary Refill: Capillary refill takes less than 2 seconds.   Neurological:      Mental Status: He is alert and oriented to person, place, and time.         Assessment/Plan:     Diagnosis and associated orders:     1. High risk homosexual behavior  raltegravir (ISENTRESS) 400 MG Tab    tenofovir (VIREAD) 300 MG Tab    emtricitabine (EMTRIVA) 200 MG Cap    Chlamydia/GC PCR Urine Or Swab    Comp Metabolic Panel    HEP B CORE AB TOTAL    HEP B SURFACE AB    HEP B SURFACE ANTIGEN    HEP C VIRUS ANTIBODY    HIV AG/AB COMBO ASSAY SCREENING      Comments/MDM:     • Patient is interested in postexposure prophylaxis.  His onset of his acute viral illness is inconsistent with seroconversion which typically takes 1 to " 2 weeks so I believe this is more incidental however based on his previous exposure still believe he is high risk and I am happy to provide postexposure prophylaxis.  He has significant concerns due to financial constraints and lack of insurance and so I have tried to sign the most affordable option to his pharmacy with instructions to begin immediately as the patient is to initiate antiviral therapy as soon as possible be effective.  I also recommended the patient contact [facility], or the health department who may also be able to provide more affordable management options.  I provided him with a lab order with instructions to get the labs performed tomorrow morning but he expressed a lot of hesitancy about the cost of these orders.  I tried to call the postexposure prophylaxis line run through [facility] however it was after hours.  • Pt wanted to get tested for covid through health department due to concerns of cost.   •   • Addendum, 3/13/2021: I spoke with the postexposure prophylaxis line to one of their specialists who agreed with this plan of care.  The recommendation would be that raltegravir may be slightly inferior to generic tivicay 50mg daily,  •   • The patient was able to  this prescription already this should be adequate.  He also recommended that most of the manufactures will provide a same day full price discount.  Therefore the recommendation was that if the hep B titer comes back low to recommend a booster.  I advised that the partner should be tested before de-escalation of therapy.  I will contact the patient via telephone follow-up to ensure he was able to get the medications.         Differential diagnosis, natural history, supportive care, and indications for immediate follow-up discussed.    Advised the patient to follow-up with the primary care physician for recheck, reevaluation, and consideration of further management.    Please note that this  dictation was created using voice recognition software. I have made a reasonable attempt to correct obvious errors, but I expect that there are errors of grammar and possibly content that I did not discover before finalizing the note.    This note was electronically signed by Adiel Dey PA-C

## 2021-03-13 NOTE — TELEPHONE ENCOUNTER
Attempt to call this patient for follow-up after initiating postexposure prophylaxis yesterday however both telephone numbers that are provided are not helpful.  The first is the home telephone number which routes to an apparent wrong number.  The second telephone number is a cell phone that is been disconnected.  At this point I will await the results of the labs and hope to have the patient reach out to me as soon as possible.    Adiel Dey P.A.-C.

## 2022-05-01 ENCOUNTER — HOSPITAL ENCOUNTER (EMERGENCY)
Facility: MEDICAL CENTER | Age: 27
End: 2022-05-01
Attending: STUDENT IN AN ORGANIZED HEALTH CARE EDUCATION/TRAINING PROGRAM
Payer: COMMERCIAL

## 2022-05-01 ENCOUNTER — APPOINTMENT (OUTPATIENT)
Dept: RADIOLOGY | Facility: MEDICAL CENTER | Age: 27
End: 2022-05-01
Attending: STUDENT IN AN ORGANIZED HEALTH CARE EDUCATION/TRAINING PROGRAM
Payer: COMMERCIAL

## 2022-05-01 VITALS
SYSTOLIC BLOOD PRESSURE: 108 MMHG | BODY MASS INDEX: 18.48 KG/M2 | HEIGHT: 69 IN | HEART RATE: 95 BPM | WEIGHT: 124.78 LBS | RESPIRATION RATE: 18 BRPM | TEMPERATURE: 100 F | DIASTOLIC BLOOD PRESSURE: 65 MMHG | OXYGEN SATURATION: 96 %

## 2022-05-01 DIAGNOSIS — R05.9 COUGH: ICD-10-CM

## 2022-05-01 DIAGNOSIS — J06.9 UPPER RESPIRATORY TRACT INFECTION, UNSPECIFIED TYPE: ICD-10-CM

## 2022-05-01 LAB
FLUAV RNA SPEC QL NAA+PROBE: POSITIVE
FLUBV RNA SPEC QL NAA+PROBE: NEGATIVE
SARS-COV-2 RNA RESP QL NAA+PROBE: NOTDETECTED
SPECIMEN SOURCE: ABNORMAL

## 2022-05-01 PROCEDURE — 0240U HCHG SARS-COV-2 COVID-19 NFCT DS RESP RNA 3 TRGT MIC: CPT

## 2022-05-01 PROCEDURE — 99283 EMERGENCY DEPT VISIT LOW MDM: CPT

## 2022-05-01 PROCEDURE — C9803 HOPD COVID-19 SPEC COLLECT: HCPCS | Performed by: STUDENT IN AN ORGANIZED HEALTH CARE EDUCATION/TRAINING PROGRAM

## 2022-05-01 PROCEDURE — 93005 ELECTROCARDIOGRAM TRACING: CPT | Performed by: STUDENT IN AN ORGANIZED HEALTH CARE EDUCATION/TRAINING PROGRAM

## 2022-05-01 PROCEDURE — 71045 X-RAY EXAM CHEST 1 VIEW: CPT

## 2022-05-01 RX ORDER — GUAIFENESIN 400 MG/1
1 TABLET ORAL 2 TIMES DAILY
Qty: 84 TABLET | Refills: 0 | Status: SHIPPED | OUTPATIENT
Start: 2022-05-01

## 2022-05-01 RX ORDER — IBUPROFEN 600 MG/1
600 TABLET ORAL EVERY 6 HOURS PRN
Qty: 30 TABLET | Refills: 0 | Status: SHIPPED | OUTPATIENT
Start: 2022-05-01

## 2022-05-01 RX ORDER — ACETAMINOPHEN 500 MG
500-1000 TABLET ORAL EVERY 6 HOURS PRN
Qty: 30 TABLET | Refills: 0 | Status: SHIPPED | OUTPATIENT
Start: 2022-05-01

## 2022-05-01 RX ORDER — ALBUTEROL SULFATE 90 UG/1
2 AEROSOL, METERED RESPIRATORY (INHALATION) EVERY 6 HOURS PRN
Qty: 8.5 G | Refills: 0 | Status: SHIPPED | OUTPATIENT
Start: 2022-05-01

## 2022-05-01 RX ORDER — ONDANSETRON 4 MG/1
4 TABLET, ORALLY DISINTEGRATING ORAL EVERY 6 HOURS PRN
Qty: 10 TABLET | Refills: 0 | Status: SHIPPED | OUTPATIENT
Start: 2022-05-01

## 2022-05-01 RX ORDER — BENZONATATE 100 MG/1
100 CAPSULE ORAL 3 TIMES DAILY PRN
Qty: 30 CAPSULE | Refills: 0 | Status: SHIPPED | OUTPATIENT
Start: 2022-05-01

## 2022-05-01 NOTE — DISCHARGE INSTRUCTIONS
He develop any severe shortness of breath, any other new or concerning symptoms return for recheck.  Continue to take Tylenol and Motrin as needed for fevers, Tessalon Perles for cough albuterol for shortness of breath Zofran for nausea.

## 2022-05-01 NOTE — ED TRIAGE NOTES
Cough, congestion, fever, chills, and sore throat x 2 days.  Patient took tylenol and motrin PTA for fever.  Patient not vaccinated for flu or COVID.

## 2022-05-01 NOTE — ED PROVIDER NOTES
"CHIEF COMPLAINT  Chief Complaint   Patient presents with   • Cough   • N/V       HPI  Ghulam Schmitt is a 27 y.o. male who presents patient presented for evaluation of a dry nonproductive cough runny nose sore throat and fevers for the past 3 days.  Patient states symptoms initially began with a runny nose, he was having muscle aches and fevers and then developed a dry nonproductive cough.  Has had a few episodes of posttussive emesis though denies it is associate abdominal pain and has been tolerating p.o. at home.  States he works as a  and is around multiple sick people frequently has not received any COVID vaccines or his influenza vaccine.    REVIEW OF SYSTEMS  See HPI for further details. All other systems are negative.     PAST MEDICAL HISTORY   has a past medical history of Ulcer.    SOCIAL HISTORY  Social History     Tobacco Use   • Smoking status: Former Smoker     Packs/day: 1.00     Types: Cigarettes     Quit date: 3/31/2022     Years since quittin.0   • Smokeless tobacco: Never Used   Substance and Sexual Activity   • Alcohol use: Yes     Comment: occasional beers and shots   • Drug use: Yes     Types: Inhaled     Comment: marijuania inhaled and pain medications that are not prescribed ot him    • Sexual activity: Not on file       SURGICAL HISTORY  patient denies any surgical history    CURRENT MEDICATIONS  Home Medications     Reviewed by Jennifer Canchola R.N. (Registered Nurse) on 22 at 0503  Med List Status: <None>   Medication Last Dose Status   emtricitabine (EMTRIVA) 200 MG Cap  Active   ibuprofen (MOTRIN) 600 MG Tab  Active   raltegravir (ISENTRESS) 400 MG Tab  Active   tenofovir (VIREAD) 300 MG Tab  Active                ALLERGIES  No Known Allergies    PHYSICAL EXAM  VITAL SIGNS: /72   Pulse 87   Temp (!) 38.1 °C (100.6 °F) (Oral)   Resp 16   Ht 1.753 m (5' 9\")   Wt 56.6 kg (124 lb 12.5 oz)   SpO2 94%   BMI 18.43 kg/m²  @[id number]::@  Pulse ox interpretation: I " interpret this pulse ox as normal.  VITALS - vital signs documented prior to this note have been reviewed and noted,  see EHR  GENERAL - awake and alert, no acute distress  HEENT - normocephalic, atraumatic, moist mucus membranes  CARDIOVASCULAR - regular rate and rhythm  PULMONARY - unlabored, no respiratory distress. No audible wheezing or  stridor.  GASTROINTESTINAL - non-tender, non-distended  NEUROLOGIC - mental status normal, speech fluid, cognition normal  MUSCULOSKELETAL -no obvious deformity or swelling  DERMATOLOGIC - warm and dry, no visible rashes  PSYCHIATRIC - normal affect, normal concentration          COURSE & MEDICAL DECISION MAKING    Presented for evaluation of a dry nonproductive cough and posttussis emesis.  Was noted to be febrile in the emergency department.  Otherwise the patient is quite well-appearing nontoxic with stable vital signs low concern for underlying sepsis.  A chest x-ray was ordered which showed no focal infiltrates or consolidations.  And physical exam seems consistent with a viral syndrome.  He was pending the results of his COVID and influenza testing at time of discharge.  Though he has no hypoxia is quite well-appearing I do believe he is appropriate for outpatient management.  Instructed him to follow-up on his results.  He will be treated symptomatically.  All pertinent return precautions were discussed with the patient, and they expressed understanding.  Patient was discharged in a stable condition          Labs Reviewed   COV-2 AND FLU A/B BY PCR ([facility])     DX-CHEST-PORTABLE (1 VIEW)   Final Result         1. No acute cardiopulmonary abnormalities are identified.                  FINAL IMPRESSION  1.  Viral syndrome  2.  Fever  3.  Nausea vomiting       Dictation Disclaimer  Please note this report has been produced using speech recognition software and  may contain errors related to that system, including errors seen in grammar,  punctuation and spelling, as well  as words and phrases that may be inappropriate.  If there are any questions or concerns, please feel free to contact the dictating  physician for clarification.        Electronically signed by: Rodrigo Hodges D.O., 5/1/2022 5:21 AM

## 2024-06-21 ENCOUNTER — HOSPITAL ENCOUNTER (OUTPATIENT)
Dept: LAB | Facility: MEDICAL CENTER | Age: 29
End: 2024-06-21
Attending: NURSE PRACTITIONER
Payer: COMMERCIAL

## 2024-06-21 LAB
ALBUMIN SERPL BCP-MCNC: 4.1 G/DL (ref 3.2–4.9)
ALBUMIN/GLOB SERPL: 0.9 G/DL
ALP SERPL-CCNC: 87 U/L (ref 30–99)
ALT SERPL-CCNC: 20 U/L (ref 2–50)
ANION GAP SERPL CALC-SCNC: 11 MMOL/L (ref 7–16)
AST SERPL-CCNC: 24 U/L (ref 12–45)
BASOPHILS # BLD AUTO: 0.8 % (ref 0–1.8)
BASOPHILS # BLD: 0.04 K/UL (ref 0–0.12)
BILIRUB SERPL-MCNC: 0.7 MG/DL (ref 0.1–1.5)
BUN SERPL-MCNC: 13 MG/DL (ref 8–22)
CALCIUM ALBUM COR SERPL-MCNC: 9 MG/DL (ref 8.5–10.5)
CALCIUM SERPL-MCNC: 9.1 MG/DL (ref 8.5–10.5)
CHLORIDE SERPL-SCNC: 105 MMOL/L (ref 96–112)
CHOLEST SERPL-MCNC: 117 MG/DL (ref 100–199)
CO2 SERPL-SCNC: 21 MMOL/L (ref 20–33)
CREAT SERPL-MCNC: 0.6 MG/DL (ref 0.5–1.4)
EOSINOPHIL # BLD AUTO: 0.23 K/UL (ref 0–0.51)
EOSINOPHIL NFR BLD: 4.5 % (ref 0–6.9)
ERYTHROCYTE [DISTWIDTH] IN BLOOD BY AUTOMATED COUNT: 40.5 FL (ref 35.9–50)
GFR SERPLBLD CREATININE-BSD FMLA CKD-EPI: 132 ML/MIN/1.73 M 2
GLOBULIN SER CALC-MCNC: 4.5 G/DL (ref 1.9–3.5)
GLUCOSE SERPL-MCNC: 89 MG/DL (ref 65–99)
HCT VFR BLD AUTO: 44.4 % (ref 42–52)
HDLC SERPL-MCNC: 28 MG/DL
HGB BLD-MCNC: 15.2 G/DL (ref 14–18)
IMM GRANULOCYTES # BLD AUTO: 0.01 K/UL (ref 0–0.11)
IMM GRANULOCYTES NFR BLD AUTO: 0.2 % (ref 0–0.9)
LDLC SERPL CALC-MCNC: 76 MG/DL
LYMPHOCYTES # BLD AUTO: 2.09 K/UL (ref 1–4.8)
LYMPHOCYTES NFR BLD: 41.1 % (ref 22–41)
MCH RBC QN AUTO: 28.5 PG (ref 27–33)
MCHC RBC AUTO-ENTMCNC: 34.2 G/DL (ref 32.3–36.5)
MCV RBC AUTO: 83.3 FL (ref 81.4–97.8)
MONOCYTES # BLD AUTO: 0.37 K/UL (ref 0–0.85)
MONOCYTES NFR BLD AUTO: 7.3 % (ref 0–13.4)
NEUTROPHILS # BLD AUTO: 2.35 K/UL (ref 1.82–7.42)
NEUTROPHILS NFR BLD: 46.1 % (ref 44–72)
NRBC # BLD AUTO: 0 K/UL
NRBC BLD-RTO: 0 /100 WBC (ref 0–0.2)
PLATELET # BLD AUTO: 307 K/UL (ref 164–446)
PMV BLD AUTO: 9.5 FL (ref 9–12.9)
POTASSIUM SERPL-SCNC: 4.3 MMOL/L (ref 3.6–5.5)
PROT SERPL-MCNC: 8.6 G/DL (ref 6–8.2)
RBC # BLD AUTO: 5.33 M/UL (ref 4.7–6.1)
SODIUM SERPL-SCNC: 137 MMOL/L (ref 135–145)
TESTOST SERPL-MCNC: 707 NG/DL (ref 175–781)
TRIGL SERPL-MCNC: 64 MG/DL (ref 0–149)
URATE SERPL-MCNC: 6.4 MG/DL (ref 2.5–8.3)
UREA BREATH TEST QL: POSITIVE
WBC # BLD AUTO: 5.1 K/UL (ref 4.8–10.8)

## 2024-06-21 PROCEDURE — 86696 HERPES SIMPLEX TYPE 2 TEST: CPT

## 2024-06-21 PROCEDURE — 36415 COLL VENOUS BLD VENIPUNCTURE: CPT

## 2024-06-21 PROCEDURE — 86702 HIV-2 ANTIBODY: CPT

## 2024-06-21 PROCEDURE — 84402 ASSAY OF FREE TESTOSTERONE: CPT

## 2024-06-21 PROCEDURE — 86803 HEPATITIS C AB TEST: CPT

## 2024-06-21 PROCEDURE — 82306 VITAMIN D 25 HYDROXY: CPT

## 2024-06-21 PROCEDURE — 86694 HERPES SIMPLEX NES ANTBDY: CPT

## 2024-06-21 PROCEDURE — 84443 ASSAY THYROID STIM HORMONE: CPT

## 2024-06-21 PROCEDURE — 84550 ASSAY OF BLOOD/URIC ACID: CPT

## 2024-06-21 PROCEDURE — 86780 TREPONEMA PALLIDUM: CPT

## 2024-06-21 PROCEDURE — 84481 FREE ASSAY (FT-3): CPT

## 2024-06-21 PROCEDURE — 86701 HIV-1ANTIBODY: CPT

## 2024-06-21 PROCEDURE — 80061 LIPID PANEL: CPT

## 2024-06-21 PROCEDURE — 85025 COMPLETE CBC W/AUTO DIFF WBC: CPT

## 2024-06-21 PROCEDURE — 84439 ASSAY OF FREE THYROXINE: CPT

## 2024-06-21 PROCEDURE — 83013 H PYLORI (C-13) BREATH: CPT

## 2024-06-21 PROCEDURE — 87389 HIV-1 AG W/HIV-1&-2 AB AG IA: CPT

## 2024-06-21 PROCEDURE — 80053 COMPREHEN METABOLIC PANEL: CPT

## 2024-06-21 PROCEDURE — 82746 ASSAY OF FOLIC ACID SERUM: CPT

## 2024-06-21 PROCEDURE — 87340 HEPATITIS B SURFACE AG IA: CPT

## 2024-06-21 PROCEDURE — 82607 VITAMIN B-12: CPT

## 2024-06-21 PROCEDURE — 84403 ASSAY OF TOTAL TESTOSTERONE: CPT

## 2024-06-21 PROCEDURE — 86695 HERPES SIMPLEX TYPE 1 TEST: CPT

## 2024-06-22 LAB
25(OH)D3 SERPL-MCNC: 34 NG/ML (ref 30–100)
FOLATE SERPL-MCNC: 10.1 NG/ML
HBV SURFACE AG SER QL: NORMAL
HCV AB SER QL: NORMAL
HIV 1+2 AB+HIV1 P24 AG SERPL QL IA: ABNORMAL
T PALLIDUM AB SER QL IA: NORMAL
T3FREE SERPL-MCNC: 3.95 PG/ML (ref 2–4.4)
T4 FREE SERPL-MCNC: 1.46 NG/DL (ref 0.93–1.7)
TSH SERPL DL<=0.005 MIU/L-ACNC: 0.52 UIU/ML (ref 0.38–5.33)
VIT B12 SERPL-MCNC: 855 PG/ML (ref 211–911)

## 2024-06-23 LAB — HSV1+2 IGG SER IA-ACNC: 12.9 IV

## 2024-06-24 LAB
HSV1 GG IGG SER-ACNC: 31.3 IV
HSV2 GG IGG SER-ACNC: 0.12 IV

## 2024-06-25 LAB
HIV 1 & 2 AB SER-IMP: ABNORMAL
HIV 1 & 2 AB SERPL IA.RAPID: ABNORMAL
HIV 2 AB SERPL QL IA: NEGATIVE
HIV1 AB SERPL QL IA: POSITIVE
MISCELLANEOUS LAB RESULT MISCLAB: NORMAL

## 2024-06-28 ENCOUNTER — HOSPITAL ENCOUNTER (OUTPATIENT)
Dept: LAB | Facility: MEDICAL CENTER | Age: 29
End: 2024-06-28
Payer: COMMERCIAL

## 2024-06-28 PROCEDURE — 87901 NFCT AGT GNTYP ALYS HIV1 REV: CPT

## 2024-06-28 PROCEDURE — 87536 HIV-1 QUANT&REVRSE TRNSCRPJ: CPT

## 2024-06-28 PROCEDURE — 86361 T CELL ABSOLUTE COUNT: CPT

## 2024-06-28 PROCEDURE — 36415 COLL VENOUS BLD VENIPUNCTURE: CPT

## 2024-06-28 PROCEDURE — 87906 NFCT AGT GNTYP ALYS HIV1: CPT

## 2024-06-28 PROCEDURE — 87900 PHENOTYPE INFECT AGENT DRUG: CPT

## 2024-06-29 LAB
CD3+CD4+ CELLS # BLD: 523 CELLS/UL (ref 430–1800)
CD3+CD4+ CELLS NFR BLD: 28 % (ref 32–64)
IMMUNODEFICIENCY MARKERS SPEC-IMP: ABNORMAL

## 2024-06-30 LAB
HIV-1 NAAT (COPIES/ML) L204479A: ABNORMAL CPY/ML
HIV-1 NAAT (LOG COPIES/ML) L295410: 4.4 LOG CPY/ML
HIV1 RNA SERPL QL NAA+PROBE: DETECTED

## 2024-07-05 LAB
HIV 1 RNA RT + PR + IN MUT DET PLAS SEQ: NORMAL
LABORATORY REPORT: NORMAL

## 2024-11-13 ENCOUNTER — OFFICE VISIT (OUTPATIENT)
Dept: URGENT CARE | Facility: PHYSICIAN GROUP | Age: 29
End: 2024-11-13
Payer: COMMERCIAL

## 2024-11-13 VITALS
TEMPERATURE: 98.2 F | DIASTOLIC BLOOD PRESSURE: 66 MMHG | HEART RATE: 68 BPM | OXYGEN SATURATION: 97 % | WEIGHT: 127 LBS | RESPIRATION RATE: 12 BRPM | BODY MASS INDEX: 18.81 KG/M2 | SYSTOLIC BLOOD PRESSURE: 94 MMHG | HEIGHT: 69 IN

## 2024-11-13 DIAGNOSIS — J01.00 ACUTE NON-RECURRENT MAXILLARY SINUSITIS: ICD-10-CM

## 2024-11-13 DIAGNOSIS — Z71.6 ENCOUNTER FOR SMOKING CESSATION COUNSELING: ICD-10-CM

## 2024-11-13 PROCEDURE — 3078F DIAST BP <80 MM HG: CPT

## 2024-11-13 PROCEDURE — 3074F SYST BP LT 130 MM HG: CPT

## 2024-11-13 PROCEDURE — 99204 OFFICE O/P NEW MOD 45 MIN: CPT

## 2024-11-13 RX ORDER — DOLUTEGRAVIR SODIUM AND LAMIVUDINE 50; 300 MG/1; MG/1
TABLET, FILM COATED ORAL
COMMUNITY
Start: 2024-11-11

## 2024-11-13 RX ORDER — NICOTINE 21 MG/24HR
1 PATCH, TRANSDERMAL 24 HOURS TRANSDERMAL EVERY 24 HOURS
Qty: 30 PATCH | Refills: 1 | Status: SHIPPED | OUTPATIENT
Start: 2024-11-13

## 2024-11-13 RX ORDER — FLUTICASONE PROPIONATE 50 MCG
1 SPRAY, SUSPENSION (ML) NASAL 2 TIMES DAILY
Qty: 16 G | Refills: 0 | Status: SHIPPED | OUTPATIENT
Start: 2024-11-13

## 2024-11-13 ASSESSMENT — ENCOUNTER SYMPTOMS
DIARRHEA: 0
NAUSEA: 0
CHILLS: 0
FEVER: 0
MYALGIAS: 0
ABDOMINAL PAIN: 0
SORE THROAT: 0
SINUS PAIN: 1
SINUS PRESSURE: 1
SHORTNESS OF BREATH: 0
HEADACHES: 0
NECK PAIN: 0
HOARSE VOICE: 0
VOMITING: 0
COUGH: 1

## 2024-11-13 ASSESSMENT — FIBROSIS 4 INDEX: FIB4 SCORE: 0.54

## 2024-11-13 NOTE — PROGRESS NOTES
Subjective:   Ghulam Schmitt is a 29 y.o. male who presents for Cough (C/o chest, on and off and nasal congestion x 2-3 weeks.) and Eye Problem (Some swelling under right eye.)      Sinus Problem  This is a new problem. The current episode started 1 to 4 weeks ago. The problem has been gradually worsening since onset. There has been no fever. The pain is mild. Associated symptoms include congestion, coughing and sinus pressure. Pertinent negatives include no chills, ear pain, headaches, hoarse voice, neck pain, shortness of breath, sneezing or sore throat. Treatments tried: OTC cold medications. The treatment provided no relief.       Review of Systems   Constitutional:  Negative for chills, fever and malaise/fatigue.   HENT:  Positive for congestion, sinus pressure and sinus pain. Negative for ear pain, hearing loss, hoarse voice, sneezing and sore throat.    Respiratory:  Positive for cough. Negative for shortness of breath.    Cardiovascular:  Negative for chest pain.   Gastrointestinal:  Negative for abdominal pain, diarrhea, nausea and vomiting.   Genitourinary:  Negative for dysuria.   Musculoskeletal:  Negative for myalgias and neck pain.   Skin:  Negative for rash.   Neurological:  Negative for headaches.       Past Medical History:   Diagnosis Date    Ulcer        Current Outpatient Medications Ordered in Epic   Medication Sig Dispense Refill    DOVATO  MG Tab       amoxicillin-clavulanate (AUGMENTIN) 875-125 MG Tab Take 1 Tablet by mouth 2 times a day for 5 days. 10 Tablet 0    fluticasone (FLONASE) 50 MCG/ACT nasal spray Administer 1 Spray into affected nostril(S) 2 times a day. 16 g 0    nicotine (NICOTINE STEP 2) 14 MG/24HR PATCH 24 HR Place 1 Patch on the skin every 24 hours. 30 Patch 1    acetaminophen (TYLENOL) 500 MG Tab Take 1-2 Tablets by mouth every 6 hours as needed for Moderate Pain. 30 Tablet 0    ibuprofen (MOTRIN) 600 MG Tab Take 1 Tablet by mouth every 6 hours as needed for Mild  "Pain or Moderate Pain. 30 Tablet 0    albuterol 108 (90 Base) MCG/ACT Aero Soln inhalation aerosol Inhale 2 Puffs every 6 hours as needed for Shortness of Breath. 8.5 g 0    ondansetron (ZOFRAN ODT) 4 MG TABLET DISPERSIBLE Take 1 Tablet by mouth every 6 hours as needed for Nausea. 10 Tablet 0    Guaifenesin 400 MG Tab Take 1 Tablet by mouth 2 times a day. 84 Tablet 0    raltegravir (ISENTRESS) 400 MG Tab Take 1 tablet by mouth 2 Times a Day. 60 tablet 0    tenofovir (VIREAD) 300 MG Tab Take 1 tablet by mouth every day. 30 tablet 0    emtricitabine (EMTRIVA) 200 MG Cap Take 1 capsule by mouth every day. 30 capsule 0    ibuprofen (MOTRIN) 600 MG Tab Take 1 Tab by mouth every 6 hours as needed. 30 Tab 0     No current Epic-ordered facility-administered medications on file.       No past surgical history on file.    Social History     Tobacco Use    Smoking status: Former     Current packs/day: 0.00     Types: Cigarettes     Quit date: 3/31/2022     Years since quittin.6    Smokeless tobacco: Never   Substance Use Topics    Alcohol use: Yes     Comment: occasional beers and shots    Drug use: Yes     Types: Inhaled     Comment: marijuania inhaled and pain medications that are not prescribed ot him        family history is not on file.        Objective:     BP 94/66 (BP Location: Left arm, Patient Position: Sitting, BP Cuff Size: Adult long)   Pulse 68   Temp 36.8 °C (98.2 °F) (Temporal)   Resp 12   Ht 1.753 m (5' 9\")   Wt 57.6 kg (127 lb)   SpO2 97%     Physical Exam  Vitals and nursing note reviewed.   Constitutional:       General: He is not in acute distress.     Appearance: Normal appearance. He is not ill-appearing.   HENT:      Head: Normocephalic and atraumatic.      Right Ear: Tympanic membrane normal.      Left Ear: Tympanic membrane normal.      Nose: Congestion present.      Right Turbinates: Enlarged.      Left Turbinates: Enlarged.      Right Sinus: Maxillary sinus tenderness present.      " Mouth/Throat:      Mouth: Mucous membranes are moist.      Pharynx: Oropharynx is clear. Uvula midline. Postnasal drip present. No pharyngeal swelling, oropharyngeal exudate, posterior oropharyngeal erythema or uvula swelling.      Tonsils: No tonsillar exudate or tonsillar abscesses. 2+ on the right. 2+ on the left.   Eyes:      Conjunctiva/sclera: Conjunctivae normal.      Pupils: Pupils are equal, round, and reactive to light.   Cardiovascular:      Rate and Rhythm: Normal rate.      Heart sounds: Normal heart sounds.   Pulmonary:      Effort: Pulmonary effort is normal. No respiratory distress.      Breath sounds: Normal breath sounds. No stridor. No wheezing or rhonchi.   Abdominal:      General: Abdomen is flat.      Palpations: Abdomen is soft.   Skin:     General: Skin is warm and dry.      Capillary Refill: Capillary refill takes less than 2 seconds.   Neurological:      Mental Status: He is alert and oriented to person, place, and time.   Psychiatric:         Mood and Affect: Mood normal.         Behavior: Behavior normal.         Assessment/Plan:       1. Acute non-recurrent maxillary sinusitis  amoxicillin-clavulanate (AUGMENTIN) 875-125 MG Tab    fluticasone (FLONASE) 50 MCG/ACT nasal spray      2. Encounter for smoking cessation counseling  nicotine (NICOTINE STEP 2) 14 MG/24HR PATCH 24 HR        After assessment it does appear that patient has possible sinusitis.  After he had had localized swelling to right maxillary sinus after illness for the past 2 to 3 weeks.  Patient has been using over-the-counter medications with no relief of symptoms.  Patient has not had any significant history of sinus infections.  At this time I did place patient on Augmentin and also prescribed Flonase.  Patient instructed to take these as prescribed.  Patient was also encouraged to take daily nondrowsy antihistamine along with sinus rinses as needed for nasal congestion.  Patient was instructed to continue use of  over-the-counter cold and cough medications as needed for symptoms.  Patient did also request if he would be able to get nicotine patches as he is attempting to quit smoking.  Patient reports that he has been on and off attempting to quit for a while but due to the price of nicotine patches patient has been limited on his results.  At this time patient was provided prescription for nicotine patches at this time.  Patient was instructed to take everything as prescribed and if any other concerns patient was instructed to return to urgent care for reevaluation.    Differential diagnosis, natural history, and supportive care discussed. We also reviewed side effects of medication including allergic response, GI upset, tendon injury, rash, sedation etc. Patient and/or guardian voices understanding.      Advised the patient to follow-up with the primary care physician for recheck, reevaluation, and consideration of further management.    I personally reviewed prior external notes and test results pertinent to today's visit as well as additional imaging and testing completed in clinic today.     Please note that this dictation was created using voice recognition software. I have made every reasonable attempt to correct obvious errors, but I expect that there are errors of grammar and possibly content that I did not discover before finalizing the note.    This note was electronically signed by FRITZ Baez

## 2025-04-29 ENCOUNTER — OFFICE VISIT (OUTPATIENT)
Dept: URGENT CARE | Facility: PHYSICIAN GROUP | Age: 30
End: 2025-04-29
Payer: COMMERCIAL

## 2025-04-29 VITALS
SYSTOLIC BLOOD PRESSURE: 122 MMHG | HEIGHT: 69 IN | HEART RATE: 85 BPM | TEMPERATURE: 99.2 F | RESPIRATION RATE: 16 BRPM | OXYGEN SATURATION: 98 % | DIASTOLIC BLOOD PRESSURE: 60 MMHG | BODY MASS INDEX: 19.17 KG/M2 | WEIGHT: 129.41 LBS

## 2025-04-29 DIAGNOSIS — J10.1 INFLUENZA B: ICD-10-CM

## 2025-04-29 DIAGNOSIS — R68.89 FLU-LIKE SYMPTOMS: Primary | ICD-10-CM

## 2025-04-29 LAB
FLUAV RNA SPEC QL NAA+PROBE: NEGATIVE
FLUBV RNA SPEC QL NAA+PROBE: POSITIVE
RSV RNA SPEC QL NAA+PROBE: NEGATIVE
S PYO DNA SPEC NAA+PROBE: NOT DETECTED
SARS-COV-2 RNA RESP QL NAA+PROBE: NEGATIVE

## 2025-04-29 RX ORDER — OSELTAMIVIR PHOSPHATE 75 MG/1
75 CAPSULE ORAL 2 TIMES DAILY
Qty: 10 CAPSULE | Refills: 0 | Status: SHIPPED | OUTPATIENT
Start: 2025-04-29

## 2025-04-29 ASSESSMENT — ENCOUNTER SYMPTOMS
NAUSEA: 1
SPUTUM PRODUCTION: 0
DIARRHEA: 1
COUGH: 1
EYES NEGATIVE: 1
FATIGUE: 1
FEVER: 1
CARDIOVASCULAR NEGATIVE: 1

## 2025-04-29 ASSESSMENT — FIBROSIS 4 INDEX: FIB4 SCORE: 0.56

## 2025-04-29 NOTE — PROGRESS NOTES
"Subjective:   Ghulam Schmitt is a 30 y.o. male who presents for Fever, Fatigue (X 1wk ), Cough (X 1 wk /Chest heaviness), Loss of Appetite, and Diarrhea      Fever   Associated symptoms include coughing, diarrhea and nausea.   Fatigue  Associated symptoms include coughing, fatigue, a fever and nausea.   Cough  Associated symptoms include a fever.   Diarrhea   Associated symptoms include coughing and a fever.       Review of Systems   Constitutional:  Positive for fatigue, fever and malaise/fatigue.   HENT: Negative.     Eyes: Negative.    Respiratory:  Positive for cough. Negative for sputum production.    Cardiovascular: Negative.    Gastrointestinal:  Positive for diarrhea and nausea.       Medications, Allergies, and current problem list reviewed today in Epic.     Objective:     /60   Pulse 85   Temp 37.3 °C (99.2 °F)   Resp 16   Ht 1.753 m (5' 9\")   Wt 58.7 kg (129 lb 6.6 oz)   SpO2 98%     Physical Exam  Vitals and nursing note reviewed.   Constitutional:       Appearance: Normal appearance.   HENT:      Head: Normocephalic and atraumatic.      Right Ear: Tympanic membrane normal.      Left Ear: Tympanic membrane normal.      Nose: Nose normal.      Mouth/Throat:      Pharynx: Oropharynx is clear.   Cardiovascular:      Rate and Rhythm: Normal rate and regular rhythm.      Pulses: Normal pulses.      Heart sounds: Normal heart sounds.   Pulmonary:      Effort: Pulmonary effort is normal.      Breath sounds: Normal breath sounds. No wheezing, rhonchi or rales.   Chest:      Chest wall: No tenderness.   Abdominal:      General: Abdomen is flat.      Palpations: Abdomen is soft.   Neurological:      Mental Status: He is alert.         Assessment/Plan:     Diagnosis and associated orders:     1. Flu-like symptoms  POCT CEPHEID COV-2, FLU A/B, RSV - PCR    POCT Cepheid Group A Strep - PCR      2. Influenza B  oseltamivir (TAMIFLU) 75 MG Cap         Comments/MDM:     Concerned because he is HIV pos " and taking medications for this, but has test neg for the virus, recommend he follow up and do his normal testing         Differential diagnosis, natural history, supportive care, and indications for immediate follow-up discussed.    Advised the patient to follow-up with the primary care physician for recheck, reevaluation, and consideration of further management.    Please note that this dictation was created using voice recognition software. I have made a reasonable attempt to correct obvious errors, but I expect that there are errors of grammar and possibly content that I did not discover before finalizing the note.    This note was electronically signed by Carlos Cortes M.D.

## 2025-05-23 ENCOUNTER — OFFICE VISIT (OUTPATIENT)
Dept: URGENT CARE | Facility: PHYSICIAN GROUP | Age: 30
End: 2025-05-23
Payer: COMMERCIAL

## 2025-05-23 VITALS
WEIGHT: 131.9 LBS | DIASTOLIC BLOOD PRESSURE: 68 MMHG | TEMPERATURE: 98.3 F | OXYGEN SATURATION: 97 % | RESPIRATION RATE: 16 BRPM | HEART RATE: 85 BPM | SYSTOLIC BLOOD PRESSURE: 104 MMHG | BODY MASS INDEX: 19.54 KG/M2 | HEIGHT: 69 IN

## 2025-05-23 DIAGNOSIS — J01.90 ACUTE NON-RECURRENT SINUSITIS, UNSPECIFIED LOCATION: ICD-10-CM

## 2025-05-23 PROCEDURE — 99213 OFFICE O/P EST LOW 20 MIN: CPT | Performed by: NURSE PRACTITIONER

## 2025-05-23 PROCEDURE — 3078F DIAST BP <80 MM HG: CPT | Performed by: NURSE PRACTITIONER

## 2025-05-23 PROCEDURE — 3074F SYST BP LT 130 MM HG: CPT | Performed by: NURSE PRACTITIONER

## 2025-05-23 ASSESSMENT — ENCOUNTER SYMPTOMS
SINUS PAIN: 1
SORE THROAT: 1
NECK PAIN: 1
CONSTITUTIONAL NEGATIVE: 1
SPUTUM PRODUCTION: 1

## 2025-05-23 ASSESSMENT — FIBROSIS 4 INDEX: FIB4 SCORE: 0.56

## 2025-05-23 ASSESSMENT — VISUAL ACUITY: OU: 1

## 2025-05-23 NOTE — PROGRESS NOTES
Subjective:     Ghulam Schmitt is a 30 y.o. male who presents for URI (Head ache, sore throat, fever, congestion, little cough, fatigue, X 2 weeks.)       Sinusitis  This is a new problem. The problem has been gradually worsening since onset. Associated symptoms include congestion, neck pain and a sore throat.     Ambient listening software ([employer]) used during this encounter with the consent of the patient. The following text is AI-assisted:    History of Present Illness  The patient presents for evaluation of sinus symptoms.    He has significant congestion, initially attributed to allergies, worsening over the past week with feverish sensations at night and a severe headache today. He reports altered taste and has been using Afrin and intermittent Claritin, avoiding other allergy medications due to anxiety and palpitations.    He has hip pain since last night and mild neck tenderness, with a mild cough and postnasal drainage.    He started using a patch for smoking cessation two weeks ago but removed it today and resumed smoking a few cigarettes, uncertain if this exacerbates his symptoms.    SOCIAL HISTORY  - Stopped smoking two weeks ago but resumed today, having a couple of cigarettes    Review of Systems   Constitutional: Negative.    HENT:  Positive for congestion, sinus pain and sore throat.    Respiratory:  Positive for sputum production.    Musculoskeletal:  Positive for neck pain.   Endo/Heme/Allergies:  Positive for environmental allergies.   All other systems reviewed and are negative.  Refer to HPI for additional details.    During this visit, appropriate PPE was worn, and hand hygiene was performed.    PMH:  has a past medical history of Ulcer.    He has no past medical history of ASTHMA, CAD (coronary artery disease), Cancer ([facility]), Congestive heart failure (HCC), COPD, Diabetes, Hypertension, Infectious disease, Liver disease, Psychiatric disorder, Renal disorder, Seizure disorder ([facility]),  "or Stroke (HCC).    MEDS: Current Medications[1]    ALLERGIES: Allergies[2]  SURGHX: Past Surgical History[3]  SOCHX:  reports that he quit smoking about 3 years ago. His smoking use included cigarettes. He has been exposed to tobacco smoke. He has never used smokeless tobacco. He reports current alcohol use. He reports current drug use. Drug: Inhaled.    FH: Per HPI as applicable/pertinent.      Objective:     /68 (BP Location: Left arm, Patient Position: Sitting, BP Cuff Size: Adult)   Pulse 85   Temp 36.8 °C (98.3 °F)   Resp 16   Ht 1.753 m (5' 9\")   Wt 59.8 kg (131 lb 14.4 oz)   SpO2 97%   BMI 19.48 kg/m²     Physical Exam  Nursing note reviewed.   Constitutional:       General: He is not in acute distress.     Appearance: He is well-developed. He is not ill-appearing or toxic-appearing.   HENT:      Right Ear: Tympanic membrane and external ear normal.      Left Ear: Tympanic membrane and external ear normal.      Nose:      Right Sinus: Frontal sinus tenderness present.      Left Sinus: Frontal sinus tenderness present.      Mouth/Throat:      Mouth: Mucous membranes are moist.      Pharynx: Pharyngeal swelling and posterior oropharyngeal erythema present.   Eyes:      General: Vision grossly intact.      Extraocular Movements: Extraocular movements intact.      Conjunctiva/sclera: Conjunctivae normal.   Neck:      Trachea: Phonation normal.   Cardiovascular:      Rate and Rhythm: Normal rate and regular rhythm.      Heart sounds: Normal heart sounds.   Pulmonary:      Effort: Pulmonary effort is normal. No respiratory distress.      Breath sounds: Normal breath sounds. No stridor. No decreased breath sounds, wheezing, rhonchi or rales.   Musculoskeletal:         General: No deformity. Normal range of motion.      Cervical back: Normal range of motion.   Lymphadenopathy:      Cervical: Cervical adenopathy present.   Skin:     General: Skin is warm and dry.      Coloration: Skin is not pale. "   Neurological:      Mental Status: He is alert and oriented to person, place, and time.      Motor: No weakness.   Psychiatric:         Behavior: Behavior normal. Behavior is cooperative.       Assessment/Plan:     1. Acute non-recurrent sinusitis, unspecified location  - amoxicillin-clavulanate (AUGMENTIN) 875-125 MG Tab; Take 1 Tablet by mouth 2 times a day for 7 days.  Dispense: 14 Tablet; Refill: 0    Rx as above sent electronically. Recommend Flonase. Antihistamine PRN. Advised to avoid prolonged use of nasal Afrin due to possible rebound congestion.    Discharge summary provided via [employer].    Monitor. Warning signs reviewed. Return precautions advised.     Differential diagnosis, natural history, supportive care, over-the-counter symptom management per 's instructions, close monitoring, and indications for immediate follow-up discussed.     All questions answered. Patient agrees with the plan of care.       [1]   Current Outpatient Medications:     amoxicillin-clavulanate (AUGMENTIN) 875-125 MG Tab, Take 1 Tablet by mouth 2 times a day for 7 days., Disp: 14 Tablet, Rfl: 0    DOVATO  MG Tab, , Disp: , Rfl:     nicotine (NICOTINE STEP 2) 14 MG/24HR PATCH 24 HR, Place 1 Patch on the skin every 24 hours., Disp: 30 Patch, Rfl: 1    acetaminophen (TYLENOL) 500 MG Tab, Take 1-2 Tablets by mouth every 6 hours as needed for Moderate Pain., Disp: 30 Tablet, Rfl: 0    ibuprofen (MOTRIN) 600 MG Tab, Take 1 Tablet by mouth every 6 hours as needed for Mild Pain or Moderate Pain., Disp: 30 Tablet, Rfl: 0    albuterol 108 (90 Base) MCG/ACT Aero Soln inhalation aerosol, Inhale 2 Puffs every 6 hours as needed for Shortness of Breath., Disp: 8.5 g, Rfl: 0    ibuprofen (MOTRIN) 600 MG Tab, Take 1 Tab by mouth every 6 hours as needed., Disp: 30 Tab, Rfl: 0    oseltamivir (TAMIFLU) 75 MG Cap, Take 1 Capsule by mouth 2 times a day., Disp: 10 Capsule, Rfl: 0    fluticasone (FLONASE) 50 MCG/ACT nasal spray,  Administer 1 Spray into affected nostril(S) 2 times a day. (Patient not taking: Reported on 4/29/2025), Disp: 16 g, Rfl: 0    ondansetron (ZOFRAN ODT) 4 MG TABLET DISPERSIBLE, Take 1 Tablet by mouth every 6 hours as needed for Nausea. (Patient not taking: Reported on 4/29/2025), Disp: 10 Tablet, Rfl: 0    Guaifenesin 400 MG Tab, Take 1 Tablet by mouth 2 times a day. (Patient not taking: Reported on 5/23/2025), Disp: 84 Tablet, Rfl: 0    raltegravir (ISENTRESS) 400 MG Tab, Take 1 tablet by mouth 2 Times a Day. (Patient not taking: Reported on 5/23/2025), Disp: 60 tablet, Rfl: 0    tenofovir (VIREAD) 300 MG Tab, Take 1 tablet by mouth every day. (Patient not taking: Reported on 5/23/2025), Disp: 30 tablet, Rfl: 0    emtricitabine (EMTRIVA) 200 MG Cap, Take 1 capsule by mouth every day. (Patient not taking: Reported on 5/23/2025), Disp: 30 capsule, Rfl: 0  [2] No Known Allergies  [3] History reviewed. No pertinent surgical history.